# Patient Record
Sex: FEMALE | Race: WHITE | URBAN - METROPOLITAN AREA
[De-identification: names, ages, dates, MRNs, and addresses within clinical notes are randomized per-mention and may not be internally consistent; named-entity substitution may affect disease eponyms.]

---

## 2017-04-11 ENCOUNTER — TELEPHONE (OUTPATIENT)
Dept: ENDOCRINOLOGY | Age: 62
End: 2017-04-11

## 2017-04-11 RX ORDER — LEVOTHYROXINE SODIUM 100 UG/1
TABLET ORAL
Qty: 30 TAB | Refills: 12 | Status: SHIPPED | OUTPATIENT
Start: 2017-04-11 | End: 2017-06-06 | Stop reason: DRUGHIGH

## 2017-05-26 ENCOUNTER — TELEPHONE (OUTPATIENT)
Dept: ENDOCRINOLOGY | Age: 62
End: 2017-05-26

## 2017-05-26 DIAGNOSIS — E03.8 OTHER SPECIFIED ACQUIRED HYPOTHYROIDISM: Primary | ICD-10-CM

## 2017-06-06 ENCOUNTER — OFFICE VISIT (OUTPATIENT)
Dept: ENDOCRINOLOGY | Age: 62
End: 2017-06-06

## 2017-06-06 VITALS
HEIGHT: 61 IN | SYSTOLIC BLOOD PRESSURE: 142 MMHG | HEART RATE: 92 BPM | BODY MASS INDEX: 38.34 KG/M2 | RESPIRATION RATE: 18 BRPM | OXYGEN SATURATION: 96 % | TEMPERATURE: 98.3 F | DIASTOLIC BLOOD PRESSURE: 93 MMHG | WEIGHT: 203.1 LBS

## 2017-06-06 DIAGNOSIS — E66.09 NON MORBID OBESITY DUE TO EXCESS CALORIES: ICD-10-CM

## 2017-06-06 DIAGNOSIS — E03.8 OTHER SPECIFIED ACQUIRED HYPOTHYROIDISM: Primary | ICD-10-CM

## 2017-06-06 RX ORDER — LEVOTHYROXINE SODIUM 112 UG/1
112 TABLET ORAL
Qty: 30 TAB | Refills: 12 | Status: SHIPPED | OUTPATIENT
Start: 2017-06-06 | End: 2018-05-17 | Stop reason: SDUPTHER

## 2017-06-06 NOTE — PATIENT INSTRUCTIONS
Start on  Unithroid   112 mcg  A  Day, on empty stomach with water only, no other meds or food or drinks   For next half hour     Stop synthroid 100       Take any kind of vitamins, calcium, iron   Pills  4 hours later

## 2017-06-06 NOTE — PROGRESS NOTES
Wt Readings from Last 3 Encounters:   06/06/17 203 lb 1.6 oz (92.1 kg)   03/01/16 201 lb (91.2 kg)   03/26/15 194 lb (88 kg)     Temp Readings from Last 3 Encounters:   06/06/17 98.3 °F (36.8 °C) (Oral)   03/01/16 98.4 °F (36.9 °C) (Oral)   03/26/15 96.5 °F (35.8 °C) (Oral)     BP Readings from Last 3 Encounters:   06/06/17 (!) 142/93   03/01/16 136/82   03/26/15 141/63     Pulse Readings from Last 3 Encounters:   06/06/17 92   03/01/16 69   03/26/15 87   patient states she has a cold and cough. Blood pressure elevated. No headache or visional disturbances noted.

## 2017-06-06 NOTE — PROGRESS NOTES
HISTORY OF PRESENT ILLNESS   Dominique Diallo is a 60  y.o. female. HPI   One year F/u visit for MNG after last visit March 2016  Gained 2  lbs     ??, discontinued TLC    Tolerating synthroid 100 mcg synthroid a day. Feels fatigued still     Denies any hyper or hypothyroid symptoms   Denies any compressive symptoms         Review of Systems   Constitutional: Negative. HENT: Negative. Eyes: Negative for pain and redness. Respiratory: Negative. Cardiovascular: Negative for chest pain, palpitations and leg swelling. Gastrointestinal: Negative. Negative for constipation. Genitourinary: Negative. Musculoskeletal: Negative for myalgias. Skin: Negative. Neurological: Negative. Endo/Heme/Allergies: Negative. Psychiatric/Behavioral: Negative for depression and memory loss. The patient does not have insomnia. Physical Exam   Constitutional: She is oriented to person, place, and time. She appears well-developed and well-nourished. HENT:   Head: Normocephalic. Eyes: Conjunctivae and extraocular motions are normal. Pupils are equal, round, and reactive to light. Neck: Normal range of motion. Neck supple. No JVD present. No tracheal deviation present. 2 times thyromegaly present. Cardiovascular: Normal rate, regular rhythm and normal heart sounds. No murmur heard. Pulmonary/Chest: Breath sounds normal.   Abdominal: Soft. Bowel sounds are normal.   Musculoskeletal: Normal range of motion. Lymphadenopathy:   She has no cervical adenopathy. Neurological: She is alert and oriented to person, place, and time. She has normal reflexes. Skin: Skin is warm. Lab Results   Component Value Date/Time    TSH 5.450 05/30/2017 10:04 AM    T4, Free 1.21 05/30/2017 10:04 AM              ASSESSMENT and PLAN     1. Hypothyroidism : perfectly euthyroid,  continue the dose of synthroid to 100 mcg daily.    Claims compliance       2 MNG :  From dec 2012- usg-- heterogenous gland and no nodules seen, but isthmus is thick - 5 mm   from jan 2011, with sub cm nodules bilaterally. Performed  usg jan 2014 :As no nodules were found to be significant, usg as needed  Does not require tissue sampling and planning on observation only. 3 obesity : admonished to control diet as she was rapidly gaining weight       4. Vit d def, HTN, HPL, pre diabetes  are being f/u by her pcp     5. Body mass index is 38.38 kg/(m^2).   Progressive weight gain noted   She is advised to be careful about her high calorie diet intake    Discussed weight loss medications   Belviq xr 20 mg a day   Discussed pros and cons           Labs in 6  months bnv

## 2017-11-30 LAB
T4 FREE SERPL-MCNC: 1.39 NG/DL (ref 0.82–1.77)
TSH SERPL DL<=0.005 MIU/L-ACNC: 0.68 UIU/ML (ref 0.45–4.5)

## 2017-12-06 ENCOUNTER — OFFICE VISIT (OUTPATIENT)
Dept: ENDOCRINOLOGY | Age: 62
End: 2017-12-06

## 2017-12-06 VITALS
HEART RATE: 82 BPM | OXYGEN SATURATION: 96 % | HEIGHT: 61 IN | DIASTOLIC BLOOD PRESSURE: 87 MMHG | WEIGHT: 211.6 LBS | TEMPERATURE: 97.7 F | BODY MASS INDEX: 39.95 KG/M2 | SYSTOLIC BLOOD PRESSURE: 148 MMHG | RESPIRATION RATE: 16 BRPM

## 2017-12-06 DIAGNOSIS — E04.2 MULTINODULAR GOITER: ICD-10-CM

## 2017-12-06 DIAGNOSIS — E04.2 NONTOXIC MULTINODULAR GOITER: Primary | ICD-10-CM

## 2017-12-06 DIAGNOSIS — R73.03 PREDIABETES: ICD-10-CM

## 2017-12-06 NOTE — PROGRESS NOTES
HISTORY OF PRESENT ILLNESS   Donya Nicolas is a 60  y.o. female. HPI   10 M  F/u visit for MNG after last visit June 2017   Tolerating synthroid 100 mcg synthroid a day. Feels fatigued still       GAINED 8 lbs   She is going to gym     Denies any hyper or hypothyroid symptoms   Denies any compressive symptoms         Review of Systems   Constitutional: Negative. HENT: Negative. Eyes: Negative for pain and redness. Respiratory: Negative. Cardiovascular: Negative for chest pain, palpitations and leg swelling. Gastrointestinal: Negative. Negative for constipation. Genitourinary: Negative. Musculoskeletal: Negative for myalgias. Skin: Negative. Neurological: Negative. Endo/Heme/Allergies: Negative. Psychiatric/Behavioral: Negative for depression and memory loss. The patient does not have insomnia. Physical Exam   Constitutional: She is oriented to person, place, and time. She appears well-developed and well-nourished. HENT:   Head: Normocephalic. Eyes: Conjunctivae and extraocular motions are normal. Pupils are equal, round, and reactive to light. Neck: Normal range of motion. Neck supple. No JVD present. No tracheal deviation present. 2 times thyromegaly present. Cardiovascular: Normal rate, regular rhythm and normal heart sounds. No murmur heard. Pulmonary/Chest: Breath sounds normal.   Abdominal: Soft. Bowel sounds are normal.   Musculoskeletal: Normal range of motion. Lymphadenopathy:   She has no cervical adenopathy. Neurological: She is alert and oriented to person, place, and time. She has normal reflexes. Skin: Skin is warm. Lab Results   Component Value Date/Time    TSH 0.677 11/29/2017 12:00 AM    T4, Free 1.39 11/29/2017 12:00 AM              ASSESSMENT and PLAN     1. Hypothyroidism : perfectly euthyroid,  continue the dose of synthroid to 100 mcg daily.    Claims compliance       2 MNG :  From dec 2012- usg-- heterogenous gland and no nodules seen, but isthmus is thick - 5 mm   from jan 2011, with sub cm nodules bilaterally. Performed  usg jan 2014 :As no nodules were found to be significant, usg as needed  Does not require tissue sampling and planning on observation only. 3 obesity : admonished to control diet as she was rapidly gaining weight       4. Vit d def, HTN, HPL, pre diabetes  are being f/u by her pcp     5. Body mass index is 39.98 kg/(m^2).   Progressive weight gain noted ( busy with sister )  She is advised to be careful about her high calorie diet intake    Discussed weight loss medications   Belviq xr 20 mg a day was suggested   Discussed pros and cons           Labs in 6  months bnv    > 50 % of time is spent on counseling   Patient voiced understanding her plan of care

## 2017-12-06 NOTE — PROGRESS NOTES
Chief Complaint   Patient presents with    Thyroid Problem     1. Have you been to the ER, urgent care clinic since your last visit? Hospitalized since your last visit? No    2. Have you seen or consulted any other health care providers outside of the [address] [place] [address] since your last visit? Include any pap smears or colon screening.  No       Wt Readings from Last 3 Encounters:   12/06/17 211 lb 9.6 oz (96 kg)   06/06/17 203 lb 1.6 oz (92.1 kg)   03/01/16 201 lb (91.2 kg)     Temp Readings from Last 3 Encounters:   12/06/17 97.7 °F (36.5 °C) (Oral)   06/06/17 98.3 °F (36.8 °C) (Oral)   03/01/16 98.4 °F (36.9 °C) (Oral)     BP Readings from Last 3 Encounters:   12/06/17 148/87   06/06/17 (!) 142/93   03/01/16 136/82     Pulse Readings from Last 3 Encounters:   12/06/17 82   06/06/17 92   03/01/16 69

## 2017-12-06 NOTE — MR AVS SNAPSHOT
Visit Information Date & Time Provider Department Dept. Phone Encounter #  
 12/6/2017  9:30 AM Laine Castaneda MD [facility] [place] [facility] [phone] [id number] Follow-up Instructions Return in about 6 months (around 6/6/2018). Upcoming Health Maintenance Date Due Hepatitis C Screening 1955 DTaP/Tdap/Td series (1 - Tdap) 1/29/1975 PAP AKA CERVICAL CYTOLOGY 1/29/1975 BREAST CANCER SCRN MAMMOGRAM 1/29/2004 FOBT Q 1 YEAR AGE 50-75 1/29/2004 ZOSTER VACCINE AGE 60> 11/29/2013 Influenza Age 5 to Adult 8/1/2017 Allergies as of 12/6/2017  Review Complete On: 12/6/2017 By: Laine Castaneda MD  
 No Known Allergies Current Immunizations  Never Reviewed No immunizations on file. Not reviewed this visit You Were Diagnosed With   
  
 Codes Comments Nontoxic multinodular goiter    -  Primary ICD-10-CM: E04.2 ICD-9-CM: 241.1 Multinodular goiter     ICD-10-CM: E04.2 ICD-9-CM: 241.1 BMI 39.0-39.9,adult     ICD-10-CM: G42.66 ICD-9-CM: V85.39 Vitals BP Pulse Temp Resp Height(growth percentile) Weight(growth percentile) 148/87 (BP 1 Location: Left arm, BP Patient Position: Sitting) 82 97.7 °F (36.5 °C) (Oral) 16 5' 1\" (1.549 m) 211 lb 9.6 oz (96 kg) SpO2 BMI OB Status Smoking Status 96% 39.98 kg/m2 Postmenopausal Never Smoker BMI and BSA Data Body Mass Index Body Surface Area  
 39.98 kg/m 2 2.03 m 2 Preferred Pharmacy Pharmacy Name Phone [address] [place] [facility] Margaret Jensen [phone] Your Updated Medication List  
  
   
This list is accurate as of: 12/6/17 10:09 AM.  Always use your most recent med list.  
  
  
  
  
 CLARITIN 10 mg tablet Generic drug:  loratadine Take 10 mg by mouth daily. FISH OIL PO Take  by mouth.  
  
 lisinopril 5 mg tablet Commonly known as:  Marissa Lights Take  by mouth daily. lorcaserin 20 mg Tb24 Commonly known as:  BELVIQ XR Take 1 Tab by mouth daily. Max Daily Amount: 1 Tab.  
  
 multivitamin tablet Commonly known as:  ONE A DAY Take 1 Tab by mouth daily. simvastatin 40 mg tablet Commonly known as:  ZOCOR Take  by mouth nightly. UNITHROID 112 mcg tablet Generic drug:  levothyroxine Take 1 Tab by mouth Daily (before breakfast). Stop levothyroxine 100 mcg dose   BMN  
  
 VITAMIN D2 50,000 unit capsule Generic drug:  ergocalciferol Take 50,000 Units by mouth. Follow-up Instructions Return in about 6 months (around 6/6/2018). Patient Instructions Unithroid   112 mcg  A  Day, on empty stomach with water only, no other meds or food or drinks   For next half hour Stop synthroid 100 Take any kind of vitamins, calcium, iron   Pills  4 hours later Introducing [facility]! [employer] introduces [employer] patient portal. Now you can access parts of your medical record, email your doctor's office, and request medication refills online. 1. In your internet browser, go to [email or web address] 2. Click on the First Time User? Click Here link in the Sign In box. You will see the New Member Sign Up page. 3. Enter your [employer] Access Code exactly as it appears below. You will not need to use this code after youve completed the sign-up process. If you do not sign up before the expiration date, you must request a new code. · [employer] Access Code: [email or web address] Expires: 3/6/2018 10:09 AM 
 
4. Enter the last four digits of your Social Security Number (xxxx) and Date of Birth (mm/dd/yyyy) as indicated and click Submit. You will be taken to the next sign-up page. 5. Create a JamLegendt ID. This will be your K[employer] Extreme login ID and cannot be changed, so think of one that is secure and easy to remember. 6. Create a [employer]Leg[employer] password. You can change your password at any time. 7. Enter your Password Reset Question and Answer. This can be used at a later time if you forget your password. 8. Enter your e-mail address. You will receive e-mail notification when new information is available in [address] 9. Click Sign Up. You can now view and download portions of your medical record. 10. Click the Download Summary menu link to download a portable copy of your medical information. If you have questions, please visit the Frequently Asked Questions section of the [employer] website. Remember, GO-SIM is NOT to be used for urgent needs. For medical emergencies, dial 911. Now available from your iPhone and Android! Please provide this summary of care documentation to your next provider. Your primary care clinician is listed as Nika [place]. If you have any questions after today's visit, please call [phone].

## 2017-12-06 NOTE — PATIENT INSTRUCTIONS
Unithroid   112 mcg  A  Day, on empty stomach with water only, no other meds or food or drinks   For next half hour     Stop synthroid 100       Take any kind of vitamins, calcium, iron   Pills  4 hours later

## 2018-05-17 DIAGNOSIS — E66.09 NON MORBID OBESITY DUE TO EXCESS CALORIES: ICD-10-CM

## 2018-05-17 RX ORDER — LEVOTHYROXINE SODIUM 112 UG/1
TABLET ORAL
Qty: 30 TAB | Refills: 12 | Status: SHIPPED | OUTPATIENT
Start: 2018-05-17 | End: 2019-03-08 | Stop reason: SDUPTHER

## 2018-08-25 ENCOUNTER — ED HISTORICAL/CONVERTED ENCOUNTER (OUTPATIENT)
Dept: OTHER | Age: 63
End: 2018-08-25

## 2018-11-07 ENCOUNTER — OP HISTORICAL/CONVERTED ENCOUNTER (OUTPATIENT)
Dept: OTHER | Age: 63
End: 2018-11-07

## 2019-02-22 ENCOUNTER — TELEPHONE (OUTPATIENT)
Dept: ENDOCRINOLOGY | Age: 64
End: 2019-02-22

## 2019-02-22 DIAGNOSIS — E04.2 NONTOXIC MULTINODULAR GOITER: Primary | ICD-10-CM

## 2019-03-01 DIAGNOSIS — E04.2 NONTOXIC MULTINODULAR GOITER: ICD-10-CM

## 2019-03-02 LAB
T4 FREE SERPL-MCNC: 1.31 NG/DL (ref 0.82–1.77)
TSH SERPL DL<=0.005 MIU/L-ACNC: 1.03 UIU/ML (ref 0.45–4.5)

## 2019-03-08 ENCOUNTER — OFFICE VISIT (OUTPATIENT)
Dept: ENDOCRINOLOGY | Age: 64
End: 2019-03-08

## 2019-03-08 VITALS
TEMPERATURE: 96.9 F | HEART RATE: 105 BPM | BODY MASS INDEX: 38.82 KG/M2 | OXYGEN SATURATION: 95 % | SYSTOLIC BLOOD PRESSURE: 127 MMHG | HEIGHT: 61 IN | RESPIRATION RATE: 18 BRPM | WEIGHT: 205.6 LBS | DIASTOLIC BLOOD PRESSURE: 74 MMHG

## 2019-03-08 DIAGNOSIS — E66.09 NON MORBID OBESITY DUE TO EXCESS CALORIES: ICD-10-CM

## 2019-03-08 DIAGNOSIS — E04.2 MULTINODULAR GOITER: ICD-10-CM

## 2019-03-08 DIAGNOSIS — E03.4 HYPOTHYROIDISM DUE TO ACQUIRED ATROPHY OF THYROID: Primary | ICD-10-CM

## 2019-03-08 RX ORDER — METFORMIN HYDROCHLORIDE 500 MG/1
1000 TABLET, EXTENDED RELEASE ORAL 2 TIMES DAILY WITH MEALS
Qty: 120 TAB | Refills: 6 | Status: SHIPPED | OUTPATIENT
Start: 2019-03-08 | End: 2019-09-09 | Stop reason: SDUPTHER

## 2019-03-08 RX ORDER — LISINOPRIL 10 MG/1
TABLET ORAL DAILY
COMMUNITY
End: 2019-11-04 | Stop reason: SDUPTHER

## 2019-03-08 RX ORDER — LEVOTHYROXINE SODIUM 112 UG/1
TABLET ORAL
Qty: 30 TAB | Refills: 12 | Status: SHIPPED | OUTPATIENT
Start: 2019-03-08 | End: 2019-09-09 | Stop reason: SDUPTHER

## 2019-03-08 RX ORDER — ZINC GLUCONATE 10 MG
LOZENGE ORAL
COMMUNITY
End: 2021-01-28 | Stop reason: ALTCHOICE

## 2019-03-08 RX ORDER — LEVOTHYROXINE SODIUM 112 UG/1
112 TABLET ORAL
Qty: 30 TAB | Refills: 12 | Status: CANCELLED | OUTPATIENT
Start: 2019-03-08

## 2019-03-08 RX ORDER — ATORVASTATIN CALCIUM 40 MG/1
TABLET, FILM COATED ORAL DAILY
COMMUNITY
End: 2019-11-04 | Stop reason: SDUPTHER

## 2019-03-08 NOTE — PROGRESS NOTES
1. Have you been to the ER, urgent care clinic since your last visit? No  Hospitalized since your last visit? No    2. Have you seen or consulted any other health care providers outside of the [address] [place] [address] since your last visit? Include any pap smears or colon screening.  No     Wt Readings from Last 3 Encounters:   03/08/19 205 lb 9.6 oz (93.3 kg)   12/06/17 211 lb 9.6 oz (96 kg)   06/06/17 203 lb 1.6 oz (92.1 kg)     Temp Readings from Last 3 Encounters:   03/08/19 96.9 °F (36.1 °C) (Oral)   12/06/17 97.7 °F (36.5 °C) (Oral)   06/06/17 98.3 °F (36.8 °C) (Oral)     BP Readings from Last 3 Encounters:   03/08/19 127/74   12/06/17 148/87   06/06/17 (!) 142/93     Pulse Readings from Last 3 Encounters:   03/08/19 (!) 105   12/06/17 82   06/06/17 92

## 2019-03-08 NOTE — PROGRESS NOTES
HISTORY OF PRESENT ILLNESS   Roderick Umaña is a 71  y.o. female. HPI     F/u visit for MNG after last visit December 2017     A long gap of 15 months   Reason unclear  Family issues     Lost 6 lbs     She got diagnosed with a1c of 6.8 %  2 weeks ago   She was suggested to take metformin but she refused it as she afraid of kidneys being hurt   She requested help for diabetes management too          Old history   Tolerating synthroid 100 mcg synthroid a day. Feels fatigued still    GAINED 8 lbs   She is going to gym     Denies any hyper or hypothyroid symptoms   Denies any compressive symptoms         Review of Systems   Constitutional: Negative. HENT: Negative. Eyes: Negative for pain and redness. Respiratory: Negative. Cardiovascular: Negative for chest pain, palpitations and leg swelling. Gastrointestinal: Negative. Negative for constipation. Genitourinary: Negative. Musculoskeletal: Negative for myalgias. Skin: Negative. Neurological: Negative. Endo/Heme/Allergies: Negative. Psychiatric/Behavioral: Negative for depression and memory loss. The patient does not have insomnia. Physical Exam   Constitutional: She is oriented to person, place, and time. She appears well-developed and well-nourished. HENT:   Head: Normocephalic. Eyes: Conjunctivae and extraocular motions are normal. Pupils are equal, round, and reactive to light. Neck: Normal range of motion. Neck supple. No JVD present. No tracheal deviation present. 2 times thyromegaly present. Cardiovascular: Normal rate, regular rhythm and normal heart sounds. No murmur heard. Pulmonary/Chest: Breath sounds normal.   Abdominal: Soft. Bowel sounds are normal.   Musculoskeletal: Normal range of motion. Lymphadenopathy:   She has no cervical adenopathy. Neurological: She is alert and oriented to person, place, and time. She has normal reflexes. Skin: Skin is warm.        Lab Results   Component Value Date/Time TSH 1.030 03/01/2019 11:29 AM    T4, Free 1.31 03/01/2019 11:29 AM              ASSESSMENT and PLAN     1. Hypothyroidism : perfectly euthyroid,  continue the dose of synthroid to 100 mcg daily. Claims compliance   Increased dose of UNITHROID  MCG A DAY  IN MAY 2017   SHE IS STAYING EITHYROID ON THIS DOSE SINCE THEN         2 . MNG :  From dec 2012- usg-- heterogenous gland and no nodules seen, but isthmus is thick - 5 mm   from jan 2011, with sub cm nodules bilaterally. Performed  usg jan 2014 :As no nodules were found to be significant, usg  Does not require tissue sampling and planning on observation only. 3.  obesity : admonished to control diet as she was rapidly gaining weight       4. Diabetes type 2 , uncontrolled    Explained importance of metformin at length and asked her to start on it at metformin 500 mg once a day and increase  as tolerated    DISCUSSED PATHO-PHYSIOLOGY OF DIABETES     Patient is advised to check blood sugars 1-2  times daily by rotation method. reviewed medications and side effects in detail  lab results and schedule of future lab studies reviewed with patient    specific diabetic recommendations: diabetic diet discussed in detail, written exchange diet given, low cholesterol diet, weight control and daily exercise discussed, home glucose monitoring emphasized, glucose meter dispensed to patient, all medications, side effects and compliance discussed carefully, diabetic Sick Day rules reviewed, handout given, foot care discussed and Podiatry visits discussed, annual eye examinations at Ophthalmology discussed, glycohemoglobin and other lab monitoring discussed and long term diabetic complications discussed    3. HTN : continue LISINOPRIL 10 MG. Patient is educated about importance of compliance with anti-hypertensives especially ARB/ACEI    4. Dyslipidemia : continue LIPITOR 40 MG .  Patient is educated about benefits and adverse effects of statins and explained how benefits outweigh risk. 5.  Body mass index is 38.85 kg/m².   Progressive weight gain noted ( busy with sister )  She is advised to be careful about her high calorie diet intake    Discussed weight loss medications   Belviq xr 20 mg a day was suggested   Discussed pros and cons           Labs in 6  months bnv    > 50 % of time is spent on counseling   Patient voiced understanding her plan of care

## 2019-08-26 DIAGNOSIS — E66.09 NON MORBID OBESITY DUE TO EXCESS CALORIES: ICD-10-CM

## 2019-08-26 DIAGNOSIS — E03.4 HYPOTHYROIDISM DUE TO ACQUIRED ATROPHY OF THYROID: ICD-10-CM

## 2019-08-26 DIAGNOSIS — E04.2 MULTINODULAR GOITER: ICD-10-CM

## 2019-08-27 LAB
CHOLEST SERPL-MCNC: 164 MG/DL (ref 100–199)
EST. AVERAGE GLUCOSE BLD GHB EST-MCNC: 140 MG/DL
HBA1C MFR BLD: 6.5 % (ref 4.8–5.6)
HDLC SERPL-MCNC: 36 MG/DL
INTERPRETATION, 910389: NORMAL
LDLC SERPL CALC-MCNC: 82 MG/DL (ref 0–99)
Lab: NORMAL
T4 FREE SERPL-MCNC: 1.42 NG/DL (ref 0.82–1.77)
TRIGL SERPL-MCNC: 232 MG/DL (ref 0–149)
TSH SERPL DL<=0.005 MIU/L-ACNC: 0.55 UIU/ML (ref 0.45–4.5)
VLDLC SERPL CALC-MCNC: 46 MG/DL (ref 5–40)

## 2019-09-09 ENCOUNTER — OFFICE VISIT (OUTPATIENT)
Dept: ENDOCRINOLOGY | Age: 64
End: 2019-09-09

## 2019-09-09 VITALS
OXYGEN SATURATION: 95 % | WEIGHT: 197.2 LBS | BODY MASS INDEX: 38.72 KG/M2 | HEART RATE: 101 BPM | SYSTOLIC BLOOD PRESSURE: 118 MMHG | HEIGHT: 60 IN | DIASTOLIC BLOOD PRESSURE: 76 MMHG | RESPIRATION RATE: 16 BRPM

## 2019-09-09 DIAGNOSIS — E11.65 UNCONTROLLED TYPE 2 DIABETES MELLITUS WITH HYPERGLYCEMIA (HCC): ICD-10-CM

## 2019-09-09 DIAGNOSIS — E03.4 HYPOTHYROIDISM DUE TO ACQUIRED ATROPHY OF THYROID: Primary | ICD-10-CM

## 2019-09-09 DIAGNOSIS — E66.09 NON MORBID OBESITY DUE TO EXCESS CALORIES: ICD-10-CM

## 2019-09-09 DIAGNOSIS — E66.01 SEVERE OBESITY (HCC): ICD-10-CM

## 2019-09-09 RX ORDER — METFORMIN HYDROCHLORIDE 500 MG/1
1000 TABLET, EXTENDED RELEASE ORAL 2 TIMES DAILY WITH MEALS
Qty: 120 TAB | Refills: 11 | Status: SHIPPED | OUTPATIENT
Start: 2019-09-09 | End: 2020-07-28 | Stop reason: SDUPTHER

## 2019-09-09 RX ORDER — LEVOTHYROXINE SODIUM 112 UG/1
TABLET ORAL
Qty: 30 TAB | Refills: 11 | Status: SHIPPED | OUTPATIENT
Start: 2019-09-09 | End: 2020-04-17 | Stop reason: SDUPTHER

## 2019-09-09 NOTE — LETTER
9/9/19 Patient: Whitney Rachel YOB: 1955 Date of Visit: 9/9/2019 Anselmo Chao MD 
[address] [place] [address] [address] [place] [id number] VIA Facsimile: [phone] Dear Anselmo Chao MD, Thank you for referring Ms. Caridad Rose to [address] [place] [address] for evaluation. My notes for this consultation are attached. If you have questions, please do not hesitate to call me. I look forward to following your patient along with you. Sincerely, Royal [place] MD

## 2019-09-09 NOTE — PROGRESS NOTES
Lab Results   Component Value Date/Time    Hemoglobin A1c 6.5 (H) 08/26/2019 08:42 AM    Hemoglobin A1c, External 6.1 12/23/2015     Lab Results   Component Value Date/Time    Cholesterol, total 164 08/26/2019 08:42 AM    HDL Cholesterol 36 (L) 08/26/2019 08:42 AM    LDL, calculated 82 08/26/2019 08:42 AM    VLDL, calculated 46 (H) 08/26/2019 08:42 AM    Triglyceride 232 (H) 08/26/2019 08:42 AM       Diabetic Foot and Eye Exam HM Status   Topic Date Due    Diabetic Foot Care  01/29/1964    Eye Exam  01/29/1964     Lab Results   Component Value Date/Time    TSH 0.545 08/26/2019 08:42 AM    T4, Free 1.42 08/26/2019 08:42 AM     Wt Readings from Last 3 Encounters:   09/09/19 197 lb 3.2 oz (89.4 kg)   03/08/19 205 lb 9.6 oz (93.3 kg)   12/06/17 211 lb 9.6 oz (96 kg)     Temp Readings from Last 3 Encounters:   03/08/19 96.9 °F (36.1 °C) (Oral)   12/06/17 97.7 °F (36.5 °C) (Oral)   06/06/17 98.3 °F (36.8 °C) (Oral)     BP Readings from Last 3 Encounters:   09/09/19 118/76   03/08/19 127/74   12/06/17 148/87     Pulse Readings from Last 3 Encounters:   09/09/19 (!) 101   03/08/19 (!) 105   12/06/17 82

## 2019-09-09 NOTE — PROGRESS NOTES
HISTORY OF PRESENT ILLNESS   Crescencio Dick is a 71  y.o. female. HPI     F/u visit for MNG AND  DM2   after last visit  March 2019       Lost 8 lbs   Had UTI  Has vertigo   Not well recently                 Old history :    A long gap of 15 months   Reason unclear  Family issues     Lost 8 lbs   She got diagnosed with a1c of 6.8 %  2 weeks ago   She was suggested to take metformin but she refused it as she afraid of kidneys being hurt   She requested help for diabetes management too          Old history   Tolerating synthroid 100 mcg synthroid a day. Feels fatigued still    GAINED 8 lbs   She is going to gym     Denies any hyper or hypothyroid symptoms   Denies any compressive symptoms         Review of Systems   Constitutional: Negative. HENT: Negative. Eyes: Negative for pain and redness. Respiratory: Negative. Cardiovascular: Negative for chest pain, palpitations and leg swelling. Gastrointestinal: Negative. Negative for constipation. Genitourinary: Negative. Musculoskeletal: Negative for myalgias. Skin: Negative. Neurological: Negative. Endo/Heme/Allergies: Negative. Psychiatric/Behavioral: Negative for depression and memory loss. The patient does not have insomnia. Physical Exam   Constitutional: She is oriented to person, place, and time. She appears well-developed and well-nourished. HENT:   Head: Normocephalic. Eyes: Conjunctivae and extraocular motions are normal. Pupils are equal, round, and reactive to light. Neck: Normal range of motion. Neck supple. No JVD present. No tracheal deviation present. 2 times thyromegaly present. Cardiovascular: Normal rate, regular rhythm and normal heart sounds. No murmur heard. Pulmonary/Chest: Breath sounds normal.   Abdominal: Soft. Bowel sounds are normal.   Musculoskeletal: Normal range of motion. Lymphadenopathy:   She has no cervical adenopathy. Neurological: She is alert and oriented to person, place, and time. She has normal reflexes. Skin: Skin is warm. Lab Results   Component Value Date/Time    TSH 0.545 08/26/2019 08:42 AM    T4, Free 1.42 08/26/2019 08:42 AM      Lab Results   Component Value Date/Time    Hemoglobin A1c 6.5 (H) 08/26/2019 08:42 AM    Hemoglobin A1c, External 6.1 12/23/2015    LDL, calculated 82 08/26/2019 08:42 AM      Lab Results   Component Value Date/Time    Cholesterol, total 164 08/26/2019 08:42 AM    HDL Cholesterol 36 (L) 08/26/2019 08:42 AM    LDL, calculated 82 08/26/2019 08:42 AM    LDL-C, External 107 12/23/2015    Triglyceride 232 (H) 08/26/2019 08:42 AM             ASSESSMENT and PLAN     1. Hypothyroidism : perfectly euthyroid,  continue the dose of synthroid to 100 mcg daily. Claims compliance   Increased dose of UNITHROID  MCG A DAY  IN MAY 2017   SHE IS STAYING EITHYROID ON THIS DOSE SINCE THEN         2 . MNG :  From dec 2012- usg-- heterogenous gland and no nodules seen, but isthmus is thick - 5 mm   from jan 2011, with sub cm nodules bilaterally. Performed  usg jan 2014 :As no nodules were found to be significant, usg  Does not require tissue sampling and planning on observation only. 3.  obesity : admonished to control diet as she was rapidly gaining weight       4. Diabetes type 2 , uncontrolled   A1C IS 6.5 %   FROM AUGUST 2019   COMPARED TO 6.8 %  FROM FEB- MARCH 2019   SHE IS TRYING ON TLC    Explained importance of metformin at length and asked her to start on it at metformin 500 mg once a day and increase  as tolerated    DISCUSSED PATHO-PHYSIOLOGY OF DIABETES     Patient is advised to check blood sugars 1-2  times daily by rotation method. reviewed medications and side effects in detail  lab results and schedule of future lab studies reviewed with patient      3. HTN : continue LISINOPRIL 10 MG. Patient is educated about importance of compliance with anti-hypertensives especially ARB/ACEI    4. Dyslipidemia : continue LIPITOR 40 MG .  Patient is educated about benefits and adverse effects of statins and explained how benefits outweigh risk. 5.  Body mass index is 38.51 kg/m².   Progressive weight gain noted ( busy with sister )  She is advised to be careful about her high calorie diet intake    Discussed weight loss medications   Belviq xr 20 mg a day was suggested   Discussed pros and cons           Labs in 6  months bnv    > 50 % of time is spent on counseling   Patient voiced understanding her plan of care

## 2019-09-09 NOTE — PATIENT INSTRUCTIONS
Unithroid   112 mcg  A  Day,   brand , on empty stomach with water only, no other meds or food or drinks   For next half hour     Take any kind of vitamins, calcium, iron   Pills  4 hours later      ----------------------------------------------------------------------------------------------------------      STAY ON METFORMIN ER  500 MG ONE PILL TO TWO PILLS WITH MEALS   ( PT IS ABLE TO TOLERATE 3 PILLS A DAY )        -------------------------------------------------------    AVOID FATTY FOODS AND RED MEATS

## 2019-09-10 LAB
ALBUMIN/CREAT UR: 5.4 MG/G CREAT (ref 0–30)
CREAT UR-MCNC: 154.2 MG/DL
MICROALBUMIN UR-MCNC: 8.4 UG/ML

## 2019-11-04 RX ORDER — ATORVASTATIN CALCIUM 40 MG/1
40 TABLET, FILM COATED ORAL DAILY
Qty: 30 TAB | Refills: 3 | Status: SHIPPED | OUTPATIENT
Start: 2019-11-04 | End: 2020-02-11

## 2019-11-04 RX ORDER — LISINOPRIL 10 MG/1
10 TABLET ORAL DAILY
Qty: 30 TAB | Refills: 3 | Status: SHIPPED | OUTPATIENT
Start: 2019-11-04 | End: 2020-07-28 | Stop reason: SDUPTHER

## 2019-11-04 NOTE — TELEPHONE ENCOUNTER
Refill on Lipitor and Lisinopril sent to [facility] 30 day supply. Patient says her pcp was filling but now Dr. Aaron Méndez should.

## 2019-11-04 NOTE — TELEPHONE ENCOUNTER
PCP: Sheba Pappas MD    Last appt: 9/9/2019  Future Appointments   Date Time Provider Deangelo Causey   3/2/2020  9:00 AM CDE [id number]   3/9/2020 11:00 AM Lewis Alfonso MD CDE [facility]       Requested Prescriptions     Pending Prescriptions Disp Refills    lisinopril (PRINIVIL, ZESTRIL) 10 mg tablet 30 Tab 3     Sig: Take 1 Tab by mouth daily.  atorvastatin (LIPITOR) 40 mg tablet 30 Tab 3     Sig: Take 1 Tab by mouth daily.

## 2020-02-11 RX ORDER — ATORVASTATIN CALCIUM 40 MG/1
TABLET, FILM COATED ORAL
Qty: 30 TAB | Refills: 3 | Status: SHIPPED | OUTPATIENT
Start: 2020-02-11 | End: 2020-06-10

## 2020-04-16 ENCOUNTER — TELEPHONE (OUTPATIENT)
Dept: ENDOCRINOLOGY | Age: 65
End: 2020-04-16

## 2020-04-16 NOTE — TELEPHONE ENCOUNTER
Patient states  [employer] is requesting prior auth for Unithroid.   Patient states she only has 2 pills left

## 2020-04-16 NOTE — TELEPHONE ENCOUNTER
PA initiated for Unithroid. Rep states Unithroid will no longer be covered at local pharmacy. Patient must receive Unithroid from [facility] (mail order). If patient does not want to use mail order then patient must receive Levothyroxine Sodium from local pharmacy. Patient informed. Patient would like MD opinion on which med she should take because MD changed Levothyroxine to Unithroid. Patient has 2 Unithroid tablets left. Please advise.

## 2020-04-16 NOTE — TELEPHONE ENCOUNTER
This patient can be told to use the unithroid - by using good Rx website where the brand unithroid comes for 18 $ - for  90 days  example at [facility] it is just 6 $ a month     This has nothing to do with her insurance plan

## 2020-04-17 DIAGNOSIS — E66.09 NON MORBID OBESITY DUE TO EXCESS CALORIES: ICD-10-CM

## 2020-04-17 RX ORDER — LEVOTHYROXINE SODIUM 112 UG/1
TABLET ORAL
Qty: 90 TAB | Refills: 4 | Status: SHIPPED | OUTPATIENT
Start: 2020-04-17 | End: 2020-07-28 | Stop reason: DRUGHIGH

## 2020-04-17 NOTE — TELEPHONE ENCOUNTER
Spoke to patient. Patient would like the Unithroid prescription to be printed out so she can pick it up and take to pharmacy today.

## 2020-06-10 RX ORDER — ATORVASTATIN CALCIUM 40 MG/1
TABLET, FILM COATED ORAL
Qty: 30 TAB | Refills: 3 | Status: SHIPPED | OUTPATIENT
Start: 2020-06-10 | End: 2020-07-28 | Stop reason: SDUPTHER

## 2020-06-11 ENCOUNTER — TELEPHONE (OUTPATIENT)
Dept: ENDOCRINOLOGY | Age: 65
End: 2020-06-11

## 2020-06-11 DIAGNOSIS — E04.2 NONTOXIC MULTINODULAR GOITER: ICD-10-CM

## 2020-06-11 DIAGNOSIS — E03.4 HYPOTHYROIDISM DUE TO ACQUIRED ATROPHY OF THYROID: ICD-10-CM

## 2020-06-11 DIAGNOSIS — E11.65 UNCONTROLLED TYPE 2 DIABETES MELLITUS WITH HYPERGLYCEMIA (HCC): Primary | ICD-10-CM

## 2020-06-30 ENCOUNTER — HOSPITAL ENCOUNTER (OUTPATIENT)
Dept: LAB | Age: 65
Discharge: HOME | End: 2020-06-30
Payer: MEDICARE

## 2020-06-30 PROCEDURE — 84443 ASSAY THYROID STIM HORMONE: CPT

## 2020-06-30 PROCEDURE — 80053 COMPREHEN METABOLIC PANEL: CPT

## 2020-06-30 PROCEDURE — 84439 ASSAY OF FREE THYROXINE: CPT

## 2020-06-30 PROCEDURE — 83036 HEMOGLOBIN GLYCOSYLATED A1C: CPT

## 2020-06-30 PROCEDURE — 36415 COLL VENOUS BLD VENIPUNCTURE: CPT

## 2020-06-30 PROCEDURE — 82043 UR ALBUMIN QUANTITATIVE: CPT

## 2020-06-30 PROCEDURE — 80061 LIPID PANEL: CPT

## 2020-07-01 LAB
ALBUMIN SERPL-MCNC: 4.5 G/DL (ref 3.8–4.8)
ALBUMIN/CREAT UR: 6 MG/G CREAT (ref 0–29)
ALBUMIN/GLOB SERPL: 2 {RATIO} (ref 1.2–2.2)
ALP SERPL-CCNC: 72 IU/L (ref 39–117)
ALT SERPL-CCNC: 24 IU/L (ref 0–32)
AST SERPL-CCNC: 18 IU/L (ref 0–40)
BILIRUB SERPL-MCNC: 0.3 MG/DL (ref 0–1.2)
BUN SERPL-MCNC: 14 MG/DL (ref 8–27)
BUN/CREAT SERPL: 18 (ref 12–28)
CALCIUM SERPL-MCNC: 10.2 MG/DL (ref 8.7–10.3)
CHLORIDE SERPL-SCNC: 107 MMOL/L (ref 96–106)
CHOLEST SERPL-MCNC: 153 MG/DL (ref 100–199)
CO2 SERPL-SCNC: 22 MMOL/L (ref 20–29)
CREAT SERPL-MCNC: 0.77 MG/DL (ref 0.57–1)
CREAT UR-MCNC: 150.7 MG/DL
EST. AVERAGE GLUCOSE BLD GHB EST-MCNC: 120 MG/DL
GLOBULIN SER CALC-MCNC: 2.2 G/DL (ref 1.5–4.5)
GLUCOSE SERPL-MCNC: 108 MG/DL (ref 65–99)
HBA1C MFR BLD: 5.8 % (ref 4.8–5.6)
HDLC SERPL-MCNC: 39 MG/DL
INTERPRETATION, 910389: NORMAL
LDLC SERPL CALC-MCNC: 75 MG/DL (ref 0–99)
Lab: NORMAL
MICROALBUMIN UR-MCNC: 9.1 UG/ML
POTASSIUM SERPL-SCNC: 5 MMOL/L (ref 3.5–5.2)
PROT SERPL-MCNC: 6.7 G/DL (ref 6–8.5)
SODIUM SERPL-SCNC: 145 MMOL/L (ref 134–144)
T4 FREE SERPL-MCNC: 1.67 NG/DL (ref 0.82–1.77)
TRIGL SERPL-MCNC: 196 MG/DL (ref 0–149)
TSH SERPL DL<=0.005 MIU/L-ACNC: 0.05 UIU/ML (ref 0.45–4.5)
VLDLC SERPL CALC-MCNC: 39 MG/DL (ref 5–40)

## 2020-07-28 ENCOUNTER — OFFICE VISIT (OUTPATIENT)
Dept: ENDOCRINOLOGY | Age: 65
End: 2020-07-28

## 2020-07-28 VITALS
WEIGHT: 179.4 LBS | HEIGHT: 60 IN | TEMPERATURE: 97.3 F | HEART RATE: 98 BPM | DIASTOLIC BLOOD PRESSURE: 85 MMHG | BODY MASS INDEX: 35.22 KG/M2 | SYSTOLIC BLOOD PRESSURE: 130 MMHG | OXYGEN SATURATION: 97 % | RESPIRATION RATE: 18 BRPM

## 2020-07-28 DIAGNOSIS — E11.65 UNCONTROLLED TYPE 2 DIABETES MELLITUS WITH HYPERGLYCEMIA (HCC): Primary | ICD-10-CM

## 2020-07-28 DIAGNOSIS — E04.2 NONTOXIC MULTINODULAR GOITER: ICD-10-CM

## 2020-07-28 DIAGNOSIS — E03.4 HYPOTHYROIDISM DUE TO ACQUIRED ATROPHY OF THYROID: ICD-10-CM

## 2020-07-28 DIAGNOSIS — I10 ESSENTIAL HYPERTENSION: ICD-10-CM

## 2020-07-28 DIAGNOSIS — E78.2 MIXED HYPERLIPIDEMIA: ICD-10-CM

## 2020-07-28 RX ORDER — ATORVASTATIN CALCIUM 40 MG/1
TABLET, FILM COATED ORAL
Qty: 90 TAB | Refills: 4 | Status: SHIPPED | OUTPATIENT
Start: 2020-07-28 | End: 2020-10-10

## 2020-07-28 RX ORDER — METFORMIN HYDROCHLORIDE 500 MG/1
TABLET, EXTENDED RELEASE ORAL
Qty: 270 TAB | Refills: 4 | Status: SHIPPED | OUTPATIENT
Start: 2020-07-28 | End: 2020-09-08

## 2020-07-28 RX ORDER — LISINOPRIL 10 MG/1
10 TABLET ORAL
Qty: 90 TAB | Refills: 4 | Status: SHIPPED | OUTPATIENT
Start: 2020-07-28 | End: 2021-09-01 | Stop reason: SDUPTHER

## 2020-07-28 RX ORDER — LEVOTHYROXINE SODIUM 100 UG/1
100 TABLET ORAL
Qty: 90 TAB | Refills: 4 | Status: SHIPPED | OUTPATIENT
Start: 2020-07-28 | End: 2021-01-28 | Stop reason: DRUGHIGH

## 2020-07-28 NOTE — PATIENT INSTRUCTIONS
start on  Unithroid  100 mcg  A  Day,   brand , on empty stomach with water only, no other meds or food or drinks   For next half hour , stop 112 mcg dose  ( 90 day ) Take any kind of vitamins, calcium, iron   Pills  4 hours later 
 
 
----------------------------------------------------------------------------------------------------- 
 
Decrease   METFORMIN ER  500 MG ONE PILL in AM   And 2 pills in  PM  WITH MEALS      90  Pills    ( 90 day supply  ) 
 
 
atorvostatin 40 mg at night  (  90 days ) Lisinopril 10 mg at night  ( 90 days )

## 2020-07-28 NOTE — PROGRESS NOTES
HISTORY OF PRESENT ILLNESS   Italo Rosario is a 76  y.o. female. HPI     F/u visit for MNG AND  DM2   after last visit  Sept 2019        Lost 18 lbs   Doing TLC very well         Old history   :     Lost 8 lbs   Had UTI  Has vertigo   Not well recently     Old history :    A long gap of 15 months   Reason unclear  Family issues   Lost 8 lbs   She got diagnosed with a1c of 6.8 %  2 weeks ago   She was suggested to take metformin but she refused it as she afraid of kidneys being hurt   She requested help for diabetes management too          Old history   Tolerating synthroid 100 mcg synthroid a day. Feels fatigued still  GAINED 8 lbs   She is going to gym     Denies any hyper or hypothyroid symptoms   Denies any compressive symptoms         Review of Systems   Constitutional: Negative. HENT: Negative. Eyes: Negative for pain and redness. Respiratory: Negative. Cardiovascular: Negative for chest pain, palpitations and leg swelling. Gastrointestinal: Negative. Negative for constipation. Genitourinary: Negative. Musculoskeletal: Negative for myalgias. Skin: Negative. Neurological: Negative. Endo/Heme/Allergies: Negative. Psychiatric/Behavioral: Negative for depression and memory loss. The patient does not have insomnia. Physical Exam   Constitutional: She is oriented to person, place, and time. She appears well-developed and well-nourished. HENT:   Head: Normocephalic. Eyes: Conjunctivae and extraocular motions are normal. Pupils are equal, round, and reactive to light. Neck: Normal range of motion. Neck supple. No JVD present. No tracheal deviation present. 2 times thyromegaly present. Cardiovascular: Normal rate, regular rhythm and normal heart sounds. No murmur heard. Pulmonary/Chest: Breath sounds normal.   Abdominal: Soft. Bowel sounds are normal.   Musculoskeletal: Normal range of motion. Lymphadenopathy:   She has no cervical adenopathy.    Neurological: She is alert and oriented to person, place, and time. She has normal reflexes. Skin: Skin is warm. Lab Results   Component Value Date/Time    TSH 0.049 (L) 06/30/2020 10:02 AM    T4, Free 1.67 06/30/2020 10:02 AM      Lab Results   Component Value Date/Time    Hemoglobin A1c 5.8 (H) 06/30/2020 10:02 AM    Hemoglobin A1c 6.5 (H) 08/26/2019 08:42 AM    Hemoglobin A1c, External 6.1 12/23/2015    Glucose 108 (H) 06/30/2020 10:02 AM    Microalb/Creat ratio (ug/mg creat.) 6 06/30/2020 10:02 AM    LDL, calculated 75 06/30/2020 10:02 AM    Creatinine 0.77 06/30/2020 10:02 AM      Lab Results   Component Value Date/Time    Cholesterol, total 153 06/30/2020 10:02 AM    HDL Cholesterol 39 (L) 06/30/2020 10:02 AM    LDL, calculated 75 06/30/2020 10:02 AM    LDL-C, External 107 12/23/2015    Triglyceride 196 (H) 06/30/2020 10:02 AM             ASSESSMENT and PLAN     1. Hypothyroidism : perfectly euthyroid,  continue the dose of synthroid to 100 mcg daily. Claims compliance   Increased dose of UNITHROID  MCG A DAY  IN MAY 2017   SHE IS STAYING EITHYROID ON THIS DOSE SINCE THEN     July 2020  : suppressed TSH  ( may be from losing weight )  Will lower dose to 100 mcg a day  , stop 112 mcg a day         2 . MNG :  From dec 2012- [facility]-- heterogenous gland and no nodules seen, but isthmus is thick - 5 mm   from jan 2011, with sub cm nodules bilaterally. Performed  usg jan 2014 :As no nodules were found to be significant, usg  Does not require tissue sampling and planning on observation only. 3.  obesity : admonished to control diet as she was rapidly gaining weight       4.  Diabetes type 2 , uncontrolled : a1c is 5.9 %   From  July 2020   Compared to  A1C IS 6.5 %   FROM AUGUST 2019   COMPARED TO 6.8 %  FROM [facility]- MARCH [facility] July 2020 :  Lost good weight   She is doing TLC   Stay on metformin er at 3 pills a day  ( one AM and 2  In PM )        Sept 2019    SHE IS TRYING ON TLC    Explained importance of metformin at length and asked her to start on it at metformin 500 mg once a day and increase  as tolerated  DISCUSSED PATHO-PHYSIOLOGY OF DIABETES     Patient is advised to check blood sugars 1-2  times daily by rotation method. reviewed medications and side effects in detail  lab results and schedule of future lab studies reviewed with patient      3. HTN : continue LISINOPRIL 10 MG. Patient is educated about importance of compliance with anti-hypertensives especially ARB/ACEI    4. Dyslipidemia : continue LIPITOR 40 MG . Patient is educated about benefits and adverse effects of statins and explained how benefits outweigh risk. 5.  Body mass index is 35.04 kg/m². Progressive weight gain noted ( busy with sister )  She is advised to be careful about her high calorie diet intake        6.  Weight loss - advising pt to get cancer surveillance done , given her sisters' history        Labs in 6  months bnv    > 50 % of time is spent on counseling   Patient voiced understanding her plan of care

## 2020-07-28 NOTE — LETTER
7/28/20 Patient: Chalo Jose YOB: 1955 Date of Visit: 7/28/2020 Finesse Dudley MD 
[place] [phone] VIA Facsimile: [phone] Dear Finesse Dudley MD, Thank you for referring Ms. Rosalio Stubbs to [id number] [place] [address] for evaluation. My notes for this consultation are attached. If you have questions, please do not hesitate to call me. I look forward to following your patient along with you. Sincerely, Claudine Lane MD

## 2020-07-28 NOTE — PROGRESS NOTES
1. Have you been to the ER, urgent care clinic since your last visit? No  Hospitalized since your last visit? No    2. Have you seen or consulted any other health care providers outside of the [address] [place] [address] since your last visit? Include any pap smears or colon screening. No    Wt Readings from Last 3 Encounters:   07/28/20 179 lb 6.4 oz (81.4 kg)   09/09/19 197 lb 3.2 oz (89.4 kg)   03/08/19 205 lb 9.6 oz (93.3 kg)     Temp Readings from Last 3 Encounters:   07/28/20 97.3 °F (36.3 °C) (Oral)   03/08/19 96.9 °F (36.1 °C) (Oral)   12/06/17 97.7 °F (36.5 °C) (Oral)     BP Readings from Last 3 Encounters:   07/28/20 130/85   09/09/19 118/76   03/08/19 127/74     Pulse Readings from Last 3 Encounters:   07/28/20 98   09/09/19 (!) 101   03/08/19 (!) 105     Lab Results   Component Value Date/Time    Hemoglobin A1c 5.8 (H) 06/30/2020 10:02 AM    Hemoglobin A1c, External 6.1 12/23/2015     Patient does not have meter today.

## 2020-09-08 RX ORDER — METFORMIN HYDROCHLORIDE 500 MG/1
TABLET, EXTENDED RELEASE ORAL
Qty: 120 TAB | Refills: 4 | Status: SHIPPED | OUTPATIENT
Start: 2020-09-08 | End: 2021-01-07

## 2020-10-10 RX ORDER — ATORVASTATIN CALCIUM 40 MG/1
TABLET, FILM COATED ORAL
Qty: 30 TAB | Refills: 4 | Status: SHIPPED | OUTPATIENT
Start: 2020-10-10 | End: 2021-01-28 | Stop reason: SDUPTHER

## 2020-10-28 DIAGNOSIS — E04.2 NONTOXIC MULTINODULAR GOITER: ICD-10-CM

## 2020-10-29 ENCOUNTER — HOSPITAL ENCOUNTER (OUTPATIENT)
Dept: LAB | Age: 65
Discharge: HOME | End: 2020-10-29
Payer: MEDICARE

## 2020-10-29 PROCEDURE — 36415 COLL VENOUS BLD VENIPUNCTURE: CPT

## 2020-10-29 PROCEDURE — 84443 ASSAY THYROID STIM HORMONE: CPT

## 2020-10-30 LAB — TSH SERPL DL<=0.005 MIU/L-ACNC: 0.83 UIU/ML (ref 0.45–4.5)

## 2021-01-07 RX ORDER — METFORMIN HYDROCHLORIDE 500 MG/1
TABLET, EXTENDED RELEASE ORAL
Qty: 120 TAB | Refills: 4 | Status: SHIPPED | OUTPATIENT
Start: 2021-01-07 | End: 2021-01-28 | Stop reason: SDUPTHER

## 2021-01-07 RX ORDER — METFORMIN HYDROCHLORIDE 500 MG/1
TABLET, EXTENDED RELEASE ORAL
Qty: 120 TAB | Refills: 4 | Status: SHIPPED | OUTPATIENT
Start: 2021-01-07 | End: 2021-03-25 | Stop reason: SDUPTHER

## 2021-01-28 ENCOUNTER — OFFICE VISIT (OUTPATIENT)
Dept: ENDOCRINOLOGY | Age: 66
End: 2021-01-28
Payer: MEDICARE

## 2021-01-28 VITALS
HEART RATE: 104 BPM | WEIGHT: 182 LBS | TEMPERATURE: 98.2 F | RESPIRATION RATE: 18 BRPM | HEIGHT: 60 IN | OXYGEN SATURATION: 95 % | DIASTOLIC BLOOD PRESSURE: 79 MMHG | BODY MASS INDEX: 35.73 KG/M2 | SYSTOLIC BLOOD PRESSURE: 126 MMHG

## 2021-01-28 DIAGNOSIS — E78.2 MIXED HYPERLIPIDEMIA: Primary | ICD-10-CM

## 2021-01-28 DIAGNOSIS — E03.4 HYPOTHYROIDISM DUE TO ACQUIRED ATROPHY OF THYROID: ICD-10-CM

## 2021-01-28 DIAGNOSIS — I10 ESSENTIAL HYPERTENSION: ICD-10-CM

## 2021-01-28 DIAGNOSIS — E11.65 UNCONTROLLED TYPE 2 DIABETES MELLITUS WITH HYPERGLYCEMIA (HCC): ICD-10-CM

## 2021-01-28 PROCEDURE — 3044F HG A1C LEVEL LT 7.0%: CPT | Performed by: INTERNAL MEDICINE

## 2021-01-28 PROCEDURE — 3017F COLORECTAL CA SCREEN DOC REV: CPT | Performed by: INTERNAL MEDICINE

## 2021-01-28 PROCEDURE — G8536 NO DOC ELDER MAL SCRN: HCPCS | Performed by: INTERNAL MEDICINE

## 2021-01-28 PROCEDURE — G8427 DOCREV CUR MEDS BY ELIG CLIN: HCPCS | Performed by: INTERNAL MEDICINE

## 2021-01-28 PROCEDURE — G8752 SYS BP LESS 140: HCPCS | Performed by: INTERNAL MEDICINE

## 2021-01-28 PROCEDURE — G8400 PT W/DXA NO RESULTS DOC: HCPCS | Performed by: INTERNAL MEDICINE

## 2021-01-28 PROCEDURE — G8417 CALC BMI ABV UP PARAM F/U: HCPCS | Performed by: INTERNAL MEDICINE

## 2021-01-28 PROCEDURE — 2022F DILAT RTA XM EVC RTNOPTHY: CPT | Performed by: INTERNAL MEDICINE

## 2021-01-28 PROCEDURE — 99214 OFFICE O/P EST MOD 30 MIN: CPT | Performed by: INTERNAL MEDICINE

## 2021-01-28 PROCEDURE — 1090F PRES/ABSN URINE INCON ASSESS: CPT | Performed by: INTERNAL MEDICINE

## 2021-01-28 PROCEDURE — 1101F PT FALLS ASSESS-DOCD LE1/YR: CPT | Performed by: INTERNAL MEDICINE

## 2021-01-28 PROCEDURE — G8510 SCR DEP NEG, NO PLAN REQD: HCPCS | Performed by: INTERNAL MEDICINE

## 2021-01-28 PROCEDURE — G8754 DIAS BP LESS 90: HCPCS | Performed by: INTERNAL MEDICINE

## 2021-01-28 RX ORDER — ATORVASTATIN CALCIUM 40 MG/1
TABLET, FILM COATED ORAL
Qty: 90 TAB | Refills: 5 | Status: SHIPPED | OUTPATIENT
Start: 2021-01-28 | End: 2021-09-01 | Stop reason: SDUPTHER

## 2021-01-28 RX ORDER — LEVOTHYROXINE SODIUM 112 UG/1
TABLET ORAL
Qty: 90 TAB | Refills: 2
Start: 2021-01-28 | End: 2021-09-08 | Stop reason: DRUGHIGH

## 2021-01-28 NOTE — PATIENT INSTRUCTIONS
SPECIFIC INSTRUCTIONS BELOW      start on  Unithroid  100 mcg  A  Day,   brand , on empty stomach with water only, no other meds or food or drinks   For next half hour , stop 112 mcg dose  ( 90 day )    Take any kind of vitamins, calcium, iron   Pills  4 hours later      -----------------------------------------------------------------------------------------------------     METFORMIN ER  500 MG ONE PILL in AM   And 2 pills in  PM  WITH MEALS      90  Pills    ( 90 day supply  ) ( No refills  Until patient calls us )      atorvostatin 40 mg at night  (  90 days )      Lisinopril 10 mg at night  ( 90 days )        PAY ATTENTION TO THESE GENERAL INSTRUCTIONS     - HEALTH MAINTENANCE IS NOT GOING TO BE UP TO DATE ON YOUR AVS- PLEASE IGNORE   - YOUR MED LIST IS NOT UP TO DATE AS SOME CHANGES ARE BEING MADE AFTER THE VISIT - FOLLOW SPECIFIC INSTRUCTIONS ABOVE     Results     *Normal results will not be notified by a phone call starting January 1 2021   *If you have an upcoming visit, the results will be discussed at the visit   *Please sign up for MY CHART if you want access to your lab and test results  *Abnormal results which require immediate attention will be notified by phone call   *Abnormal results which do not require immediate assistance will be notified in 1-2 weeks       Refills    -    have your pharmacy send us a refill request  Phone calls  -  Allow  24 hrs.  for non-urgent calls to be returned  Prior authorization - It may take 2-4 weeks to process  Forms  -  FMLA, DMV etc., will take up to 2 weeks to process  Cancellations - please notify the office 2 days in advance   Samples  - will only be dispensed at visits     --------------------------------------------------------------------------------------------

## 2021-01-28 NOTE — LETTER
1/31/2021 Patient: Edie Gant YOB: 1955 Date of Visit: 1/28/2021 Gui Barba MD 
[place] [phone] Via Fax: [phone] Dear Gui Barba MD, Thank you for referring Ms. Erasmo Drake to [address] [place] [address] for evaluation. My notes for this consultation are attached. If you have questions, please do not hesitate to call me. I look forward to following your patient along with you. Sincerely, Phylicia Mathur MD

## 2021-01-28 NOTE — PROGRESS NOTES
1. Have you been to the ER, urgent care clinic since your last visit? No  Hospitalized since your last visit? No    2. Have you seen or consulted any other health care providers outside of the [address] [place] [address] since your last visit? Include any pap smears or colon screening. No    Wt Readings from Last 3 Encounters:   01/28/21 182 lb (82.6 kg)   07/28/20 179 lb 6.4 oz (81.4 kg)   09/09/19 197 lb 3.2 oz (89.4 kg)     Temp Readings from Last 3 Encounters:   01/28/21 98.2 °F (36.8 °C) (Oral)   07/28/20 97.3 °F (36.3 °C) (Oral)   03/08/19 96.9 °F (36.1 °C) (Oral)     BP Readings from Last 3 Encounters:   01/28/21 126/79   07/28/20 130/85   09/09/19 118/76     Pulse Readings from Last 3 Encounters:   01/28/21 (!) 104   07/28/20 98   09/09/19 (!) 101     Lab Results   Component Value Date/Time    Hemoglobin A1c 5.7 (H) 01/21/2021 09:05 AM    Hemoglobin A1c, External 6.1 12/23/2015     Patient does not have meter today.

## 2021-01-28 NOTE — PROGRESS NOTES
HISTORY OF PRESENT ILLNESS   Akanksha Shaw is a 78   y.o. female. HPI    F/u visit for MNG AND  DM2   after last visit  July 2020    Gained 3 lbs   Her sisters  Have renal problems and has stayed busy quite a bit   One sister had renal transplant -  And it was rejected  -  SICK  And that kept  Her mind busy   She says her refills are not done on LIPITOR , but on oct 2 2020 - refills were sent to pharmacy         July 2020     Lost 18 lbs   Doing TLC very well       Old history :    A long gap of 15 months   Reason unclear  Family issues   Lost 8 lbs   She got diagnosed with a1c of 6.8 %  2 weeks ago   She was suggested to take metformin but she refused it as she afraid of kidneys being hurt   She requested help for diabetes management too            Review of Systems   Constitutional: Negative. HENT: Negative. Psychiatric/Behavioral: Negative for depression and memory loss. The patient does not have insomnia. Physical Exam   Constitutional: She is oriented to person, place, and time. She appears well-developed and well-nourished. HENT:   Head: Normocephalic. Eyes: Conjunctivae and extraocular motions are normal. Pupils are equal, round, and reactive to light. Neck: Normal range of motion. Neck supple. No JVD present. No tracheal deviation present. 2 times thyromegaly present. Cardiovascular: Normal rate, regular rhythm and normal heart sounds. No murmur heard.    Pulmonary/Chest: Breath sounds normal.       Lab Results   Component Value Date/Time    TSH 6.31 (H) 01/21/2021 09:05 AM    T4, Free 1.1 01/21/2021 09:05 AM      Lab Results   Component Value Date/Time    Hemoglobin A1c 5.7 (H) 01/21/2021 09:05 AM    Hemoglobin A1c 5.8 (H) 06/30/2020 10:02 AM    Hemoglobin A1c 6.5 (H) 08/26/2019 08:42 AM    Hemoglobin A1c, External 6.1 12/23/2015    Glucose 118 (H) 01/21/2021 09:05 AM    Microalb/Creat ratio (ug/mg creat.) 6 06/30/2020 10:02 AM    LDL, calculated 250.6 (H) 01/21/2021 09:05 AM Creatinine 1.05 (H) 01/21/2021 09:05 AM      Lab Results   Component Value Date/Time    Cholesterol, total 349 (H) 01/21/2021 09:05 AM    HDL Cholesterol 41 01/21/2021 09:05 AM    LDL, calculated 250.6 (H) 01/21/2021 09:05 AM    LDL-C, External 107 12/23/2015    Triglyceride 287 (H) 01/21/2021 09:05 AM    CHOL/HDL Ratio 8.5 (H) 01/21/2021 09:05 AM             ASSESSMENT and PLAN     1. Hypothyroidism : perfectly euthyroid,  continue the dose of synthroid to 100 mcg daily. Claims compliance  -  Increased dose of UNITHROID  MCG A DAY  IN MAY 2017   SHE IS STAYING EITHYROID ON THIS DOSE SINCE THEN     July 2020  : suppressed TSH  ( may be from losing weight ), lowered  dose to 100 mcg a day  , stopped  112 mcg a day   Oct 2020  ; TSH  0.8     Jan 2021  :  TSH  -  UP again  To 6 and I AM INCREASING DOSE BACK  MCG A DAY   Take 100 mcg dose  Which she has 90 day supply - 2 pills on Sundays       2. Diabetes type 2 , uncontrolled : a1c is 5.9 %   From  July 2020   Compared to  A1C IS 6.5 %   FROM AUGUST 2019   COMPARED TO 6.8 %  FROM FEB- [facility] 2019     She is doing well on metformin er at 3 pills a day  ( one AM and 2  In PM )  Patient is advised to check blood sugars 1-2  times daily by rotation method. 3. HTN : continue LISINOPRIL 10 MG. Patient is educated about importance of compliance with anti-hypertensives especially ARB/ACEI    4. Dyslipidemia : the LDL is going along with familial hypercholesteremia   Dramatic change because of non compliance with lipitor     continue LIPITOR 40 MG . Patient is educated about risks associated with severe LDL including CAD and CVD . 5.  Body mass index is 35.54 kg/m². Progressive weight gain noted ( busy with sister )  She is advised to be careful about her high calorie diet intake      6. Eduardo Palomo MNG :  From dec 2012- [facility]-- heterogenous gland and no nodules seen, but isthmus is thick - 5 mm   from jan 2011, with sub cm nodules bilaterally.    Performed  usg jan 2014 :As no nodules were found to be significant, usg  Does not require tissue sampling and planning on observation only.            Labs in 6  months bnv    > 50 % of time is spent on counseling   Patient voiced understanding her plan of care

## 2021-01-31 ENCOUNTER — DOCUMENTATION ONLY (OUTPATIENT)
Dept: ENDOCRINOLOGY | Age: 66
End: 2021-01-31

## 2021-01-31 NOTE — PROGRESS NOTES
Called patient and made her be aware that her lab papers were by mistake given to anther patient - Mr. Bernarda Hilario who was seen today and he returned her lab sheets back on the same day     Kalyan Ellsworth MD

## 2021-03-25 ENCOUNTER — TELEPHONE (OUTPATIENT)
Dept: ENDOCRINOLOGY | Age: 66
End: 2021-03-25

## 2021-03-25 RX ORDER — METFORMIN HYDROCHLORIDE 500 MG/1
TABLET, EXTENDED RELEASE ORAL
Qty: 120 TAB | Refills: 3 | Status: SHIPPED | OUTPATIENT
Start: 2021-03-25 | End: 2021-09-08 | Stop reason: SDUPTHER

## 2021-09-01 ENCOUNTER — OFFICE VISIT (OUTPATIENT)
Dept: ENDOCRINOLOGY | Age: 66
End: 2021-09-01
Payer: MEDICARE

## 2021-09-01 VITALS
WEIGHT: 179.6 LBS | TEMPERATURE: 97.2 F | OXYGEN SATURATION: 96 % | HEART RATE: 97 BPM | RESPIRATION RATE: 18 BRPM | SYSTOLIC BLOOD PRESSURE: 158 MMHG | BODY MASS INDEX: 35.26 KG/M2 | HEIGHT: 60 IN | DIASTOLIC BLOOD PRESSURE: 91 MMHG

## 2021-09-01 DIAGNOSIS — E11.65 UNCONTROLLED TYPE 2 DIABETES MELLITUS WITH HYPERGLYCEMIA (HCC): Primary | ICD-10-CM

## 2021-09-01 DIAGNOSIS — E03.4 HYPOTHYROIDISM DUE TO ACQUIRED ATROPHY OF THYROID: ICD-10-CM

## 2021-09-01 DIAGNOSIS — I10 ESSENTIAL HYPERTENSION: ICD-10-CM

## 2021-09-01 DIAGNOSIS — E78.2 MIXED HYPERLIPIDEMIA: ICD-10-CM

## 2021-09-01 LAB
ALBUMIN SERPL-MCNC: 4.1 G/DL (ref 3.5–5)
ALBUMIN/GLOB SERPL: 1.4 {RATIO} (ref 1.1–2.2)
ALP SERPL-CCNC: 84 U/L (ref 45–117)
ALT SERPL-CCNC: 28 U/L (ref 12–78)
ANION GAP SERPL CALC-SCNC: 5 MMOL/L (ref 5–15)
AST SERPL-CCNC: 19 U/L (ref 15–37)
BILIRUB SERPL-MCNC: 0.4 MG/DL (ref 0.2–1)
BUN SERPL-MCNC: 13 MG/DL (ref 6–20)
BUN/CREAT SERPL: 16 (ref 12–20)
CALCIUM SERPL-MCNC: 9.9 MG/DL (ref 8.5–10.1)
CHLORIDE SERPL-SCNC: 109 MMOL/L (ref 97–108)
CHOLEST SERPL-MCNC: 198 MG/DL
CO2 SERPL-SCNC: 28 MMOL/L (ref 21–32)
CREAT SERPL-MCNC: 0.8 MG/DL (ref 0.55–1.02)
CREAT UR-MCNC: 73.5 MG/DL
GLOBULIN SER CALC-MCNC: 2.9 G/DL (ref 2–4)
GLUCOSE SERPL-MCNC: 136 MG/DL (ref 65–100)
HBA1C MFR BLD HPLC: 5.7 %
HDLC SERPL-MCNC: 49 MG/DL
HDLC SERPL: 4 {RATIO} (ref 0–5)
LDLC SERPL CALC-MCNC: 99 MG/DL (ref 0–100)
MICROALBUMIN UR-MCNC: 0.67 MG/DL
MICROALBUMIN/CREAT UR-RTO: 9 MG/G (ref 0–30)
POTASSIUM SERPL-SCNC: 4.3 MMOL/L (ref 3.5–5.1)
PROT SERPL-MCNC: 7 G/DL (ref 6.4–8.2)
SODIUM SERPL-SCNC: 142 MMOL/L (ref 136–145)
TRIGL SERPL-MCNC: 250 MG/DL (ref ?–150)
TSH SERPL DL<=0.05 MIU/L-ACNC: 0.19 UIU/ML (ref 0.36–3.74)
VLDLC SERPL CALC-MCNC: 50 MG/DL

## 2021-09-01 PROCEDURE — 3044F HG A1C LEVEL LT 7.0%: CPT | Performed by: INTERNAL MEDICINE

## 2021-09-01 PROCEDURE — 1090F PRES/ABSN URINE INCON ASSESS: CPT | Performed by: INTERNAL MEDICINE

## 2021-09-01 PROCEDURE — 1101F PT FALLS ASSESS-DOCD LE1/YR: CPT | Performed by: INTERNAL MEDICINE

## 2021-09-01 PROCEDURE — G8755 DIAS BP > OR = 90: HCPCS | Performed by: INTERNAL MEDICINE

## 2021-09-01 PROCEDURE — G8510 SCR DEP NEG, NO PLAN REQD: HCPCS | Performed by: INTERNAL MEDICINE

## 2021-09-01 PROCEDURE — G8417 CALC BMI ABV UP PARAM F/U: HCPCS | Performed by: INTERNAL MEDICINE

## 2021-09-01 PROCEDURE — G8536 NO DOC ELDER MAL SCRN: HCPCS | Performed by: INTERNAL MEDICINE

## 2021-09-01 PROCEDURE — G8400 PT W/DXA NO RESULTS DOC: HCPCS | Performed by: INTERNAL MEDICINE

## 2021-09-01 PROCEDURE — G8753 SYS BP > OR = 140: HCPCS | Performed by: INTERNAL MEDICINE

## 2021-09-01 PROCEDURE — 83036 HEMOGLOBIN GLYCOSYLATED A1C: CPT | Performed by: INTERNAL MEDICINE

## 2021-09-01 PROCEDURE — G8427 DOCREV CUR MEDS BY ELIG CLIN: HCPCS | Performed by: INTERNAL MEDICINE

## 2021-09-01 PROCEDURE — 99214 OFFICE O/P EST MOD 30 MIN: CPT | Performed by: INTERNAL MEDICINE

## 2021-09-01 PROCEDURE — 3017F COLORECTAL CA SCREEN DOC REV: CPT | Performed by: INTERNAL MEDICINE

## 2021-09-01 PROCEDURE — 2022F DILAT RTA XM EVC RTNOPTHY: CPT | Performed by: INTERNAL MEDICINE

## 2021-09-01 RX ORDER — LISINOPRIL 10 MG/1
10 TABLET ORAL
Qty: 90 TABLET | Refills: 3 | Status: SHIPPED | OUTPATIENT
Start: 2021-09-01 | End: 2022-03-02 | Stop reason: SDUPTHER

## 2021-09-01 RX ORDER — ATORVASTATIN CALCIUM 40 MG/1
TABLET, FILM COATED ORAL
Qty: 90 TABLET | Refills: 3 | Status: SHIPPED | OUTPATIENT
Start: 2021-09-01 | End: 2022-03-02 | Stop reason: SDUPTHER

## 2021-09-01 NOTE — PATIENT INSTRUCTIONS
SPECIFIC INSTRUCTIONS BELOW       Do not refill until labs result from today sept 1 2021       Unithroid  ? ? mcg  A  Day,   brand , on empty stomach with water only, no other meds or food or drinks   For next half hour       Take any kind of vitamins, calcium, iron   Pills  4 hours later      -----------------------------------------------------------------------------------------------------     METFORMIN ER  500 MG ONE PILL in AM   And 2 pills in  PM  WITH MEALS      90  Pills    ( 90 day supply  ) ( No refills  Until patient calls us )      atorvostatin 40 mg at night  (  90 days )      Lisinopril 10 mg at night  ( 90 days )          -------------PAY ATTENTION TO THESE GENERAL INSTRUCTIONS -----------------    -ANY tests other than blood work, which you opt to do  outside the  [facility] imaging facilities, you are responsible for prior authorizations if  required    - HEALTH MAINTENANCE IS NOT GOING TO BE UP TO DATE ON YOUR AVS- PLEASE IGNORE   - YOUR MED LIST IS NOT UP TO DATE AS SOME CHANGES ARE BEING MADE AFTER THE VISIT - FOLLOW SPECIFIC INSTRUCTIONS  ABOVE     Results     *Normal results will not be notified by a phone call starting January 1 2021   *If you have an upcoming visit, the results will be discussed at the visit   *Please sign up for MY CHART if you want access to your lab and test results  *Abnormal results which require immediate attention will be notified by phone call   *Abnormal results which do not require immediate assistance will be notified in 1-2 weeks       Refills    -    have your pharmacy send us a refill request . Refills are done max for one year and a visit is a must before refills are extended    Follow up appointments -  highly encourage you to make it when you are checking out. We can accommodate you into the schedule based on your clinical situation, but not for extending refills beyond a year.  Labs are important to give refills and is important to get labs before the visit     Phone calls  -  Allow  24 hrs.  for non-urgent calls to be returned  Prior authorization - It may take 2-4 weeks to process  Forms  -  FMLA, DMV etc., will take up to 2 weeks to process  Cancellations - please notify the office 2 days in advance   Samples  - will only be dispensed at visits       If not showing for the appointments and cancelling appointments within 24 hours are kept track of and three  of such situations in  two consecutive years will likely be considered for termination from the practice    -------------------------------------------------------------------------------------------------------------------

## 2021-09-01 NOTE — PROGRESS NOTES
Room 1    Identified pt with two pt identifiers(name and ). Reviewed record in preparation for visit and have obtained necessary documentation. All patient medications has been reviewed. Chief Complaint   Patient presents with    Diabetes    Medication Evaluation       3 most recent PHQ Screens 2021   Little interest or pleasure in doing things Not at all   Feeling down, depressed, irritable, or hopeless Not at all   Total Score PHQ 2 0         Health Maintenance Review: Patient reminded of \"due or due soon\" health maintenance. I have asked the patient to contact his/her primary care provider (PCP) for follow-up on his/her health maintenance. Vitals:    21 1048   Pulse: 97   Resp: 18   Temp: 97.2 °F (36.2 °C)   TempSrc: Temporal   SpO2: 96%   Weight: 179 lb 9.6 oz (81.5 kg)   Height: 5' (1.524 m)   PainSc:   0 - No pain         Lab Results   Component Value Date/Time    Hemoglobin A1c 5.7 (H) 2021 09:05 AM    Hemoglobin A1c, External 6.1 2015 12:00 AM       Coordination of Care Questionnaire:   1) Have you been to an emergency room, urgent care, or hospitalized since your last visit?   no       2. Have seen or consulted any other health care provider since your last visit? NO      Patient is accompanied by self I have received verbal consent from Ross Mohs to discuss any/all medical information while they are present in the room.

## 2021-09-01 NOTE — PROGRESS NOTES
HISTORY OF PRESENT ILLNESS   Argenis Fernández is a 78   y.o. female. HPI    F/u visit for MNG AND  DM2   after last visit  Jan 2021       She was busy this AM and noted her BP Is higher   She went to [place] in the interim, and had 100 mcg synthroid filled in between   The correct dose was 112 mcg a day         Jan 2021     Gained 3 lbs   Her sisters  Have renal problems and has stayed busy quite a bit   One sister had renal transplant -  And it was rejected  -  SICK  And that kept  Her mind busy   She says her refills are not done on LIPITOR , but on oct 2 2020 - refills were sent to pharmacy         July 2020     Lost 18 lbs   Doing TLC very well       Old history :    A long gap of 15 months   Reason unclear  Family issues   Lost 8 lbs   She got diagnosed with a1c of 6.8 %  2 weeks ago   She was suggested to take metformin but she refused it as she afraid of kidneys being hurt   She requested help for diabetes management too        Review of Systems   Constitutional: Negative. HENT: Negative. Psychiatric/Behavioral: Negative for depression and memory loss. The patient does not have insomnia. Physical Exam   Constitutional: She is oriented to person, place, and time. She appears well-developed and well-nourished. Neck: Normal range of motion. Neck supple. No JVD present. No tracheal deviation present. 2 times thyromegaly present. Pulmonary/Chest: Breath sounds normal.       Labs not done         ASSESSMENT and PLAN     1. Hypothyroidism : perfectly euthyroid,  continue the dose of synthroid to 100 mcg daily.    Claims compliance  -  Increased dose of UNITHROID  MCG A DAY  IN MAY 2017   SHE IS STAYING EITHYROID ON THIS DOSE SINCE THEN     July 2020  : suppressed TSH  ( may be from losing weight ), lowered  dose to 100 mcg a day  , stopped  112 mcg a day   Oct 2020  ; TSH  0.8     Jan 2021  :  TSH  -  UP again  To 6 and I AM INCREASING DOSE BACK  MCG A DAY   Take 100 mcg dose  Which she has 90 day supply - 2 pills on Sundays       Sept 2021  : labs today   Supposed to be  On 112 mcg dose a day , but  she is taking yellow 100 mcg dose pills that too only one pill a day   She ran out of refills on her trip to [place], she asked for refill by showing the pill bottle. She had plenty of 100 mcg dose pills left at last visit and was told to take extra pill on sundays             2. Diabetes type 2 , uncontrolled : a1c is 5.9 %   From  July 2020   Compared to  A1C IS 6.5 %   FROM AUGUST 2019   COMPARED TO 6.8 %  FROM FEB- [facility] 2019   She is doing well on metformin er at 3 pills a day  ( one AM and 2  In PM )    Patient is advised to check blood sugars 1-2  times daily by rotation method. 3. HTN : continue LISINOPRIL 10 MG. 4.  Dyslipidemia : the LDL is going along with familial hypercholesteremia   Dramatic change because of compliance with lipitor     continue LIPITOR 40 MG . Patient is educated about risks associated with severe LDL including CAD and CVD . 5.  Body mass index is 35.08 kg/m². 6. . MNG :  From dec 2012- [facility]-- heterogenous gland and no nodules seen, but isthmus is thick - 5 mm   from jan 2011, with sub cm nodules bilaterally. Performed  usg jan 2014 :As no nodules were found to be significant, usg  Does not require tissue sampling and planning on observation only.            Labs in 6  months bnv      Reviewed results with patient and discussed the labs being ordered today/bnv  Patient voiced understanding of plan of care

## 2021-09-01 NOTE — LETTER
9/1/2021    Patient: Lolly Eckert   YOB: 1955   Date of Visit: 9/1/2021     Aldo Ge MD  [facility]  [address] [place] [id number]  Via Fax: [phone]    Dear Aldo Ge MD,      Thank you for referring Ms. Sigrid Maza to [address] [place] [address] for evaluation. My notes for this consultation are attached. If you have questions, please do not hesitate to call me. I look forward to following your patient along with you.       Sincerely,    Fanny Veliz MD

## 2021-09-02 NOTE — PROGRESS NOTES
Pharmacist states this patient has not received any meds from them since January 5, 2021 for a 90 day supply of Unithroid 112mcg    Left message for patient to return call.

## 2021-09-02 NOTE — PROGRESS NOTES
When I am expecting high TSH, the TSH is lower  0.1   Somehow, this is not adding up right fashion   Ask pt to bring her medication bottle and any left at her house   Call pharmacy for the refill pattern, doses in the past 6 months

## 2021-09-03 RX ORDER — LEVOTHYROXINE SODIUM 100 UG/1
TABLET ORAL
Qty: 90 TABLET | Refills: 4 | OUTPATIENT
Start: 2021-09-03

## 2021-09-08 ENCOUNTER — DOCUMENTATION ONLY (OUTPATIENT)
Dept: ENDOCRINOLOGY | Age: 66
End: 2021-09-08

## 2021-09-08 RX ORDER — LEVOTHYROXINE SODIUM 112 UG/1
TABLET ORAL
Qty: 90 TABLET | Refills: 3 | Status: CANCELLED | OUTPATIENT
Start: 2021-09-08

## 2021-09-08 RX ORDER — LEVOTHYROXINE SODIUM 100 UG/1
TABLET ORAL
Qty: 90 TABLET | Refills: 1 | Status: SHIPPED | OUTPATIENT
Start: 2021-09-08 | End: 2022-03-02 | Stop reason: SDUPTHER

## 2021-09-08 RX ORDER — METFORMIN HYDROCHLORIDE 500 MG/1
TABLET, EXTENDED RELEASE ORAL
Qty: 270 TABLET | Refills: 3 | Status: SHIPPED | OUTPATIENT
Start: 2021-09-08 | End: 2022-03-02 | Stop reason: SDUPTHER

## 2021-09-08 NOTE — PROGRESS NOTES
I spoke to patient and explained that thyroid is abusable medication and we keep track of refills etc.,     I suggested to go higher dose to 112 mcg unithroid - filled jan and April  Per pt   She filled likely 100 mcg - she says in [place] this time sept 2021 ,  are at 0.1 indicating lower dose is needed than 100 mcg which is 88 mcg dose   I decided not to change dose and kept her still on   100 mcg a day , stopped 112 mcg dose     Dione Taylor MD

## 2022-02-22 LAB
ALBUMIN SERPL-MCNC: 4.8 G/DL (ref 3.8–4.8)
ALBUMIN/CREAT UR: <7 MG/G CREAT (ref 0–29)
ALBUMIN/GLOB SERPL: 2.3 {RATIO} (ref 1.2–2.2)
ALP SERPL-CCNC: 81 IU/L (ref 44–121)
ALT SERPL-CCNC: 16 IU/L (ref 0–32)
AST SERPL-CCNC: 19 IU/L (ref 0–40)
BILIRUB SERPL-MCNC: 0.4 MG/DL (ref 0–1.2)
BUN SERPL-MCNC: 13 MG/DL (ref 8–27)
BUN/CREAT SERPL: 13 (ref 12–28)
CALCIUM SERPL-MCNC: 10.3 MG/DL (ref 8.7–10.3)
CHLORIDE SERPL-SCNC: 102 MMOL/L (ref 96–106)
CHOLEST SERPL-MCNC: 200 MG/DL (ref 100–199)
CO2 SERPL-SCNC: 24 MMOL/L (ref 20–29)
CREAT SERPL-MCNC: 0.97 MG/DL (ref 0.57–1)
CREAT UR-MCNC: 44.1 MG/DL
EST. AVERAGE GLUCOSE BLD GHB EST-MCNC: 123 MG/DL
GLOBULIN SER CALC-MCNC: 2.1 G/DL (ref 1.5–4.5)
GLUCOSE SERPL-MCNC: 100 MG/DL (ref 65–99)
HBA1C MFR BLD: 5.9 % (ref 4.8–5.6)
HDLC SERPL-MCNC: 43 MG/DL
IMP & REVIEW OF LAB RESULTS: NORMAL
LDLC SERPL CALC-MCNC: 123 MG/DL (ref 0–99)
MICROALBUMIN UR-MCNC: <3 UG/ML
POTASSIUM SERPL-SCNC: 4.6 MMOL/L (ref 3.5–5.2)
PROT SERPL-MCNC: 6.9 G/DL (ref 6–8.5)
SODIUM SERPL-SCNC: 140 MMOL/L (ref 134–144)
T4 FREE SERPL-MCNC: 1.51 NG/DL (ref 0.82–1.77)
TRIGL SERPL-MCNC: 190 MG/DL (ref 0–149)
TSH SERPL DL<=0.005 MIU/L-ACNC: 0.35 UIU/ML (ref 0.45–4.5)
VLDLC SERPL CALC-MCNC: 34 MG/DL (ref 5–40)

## 2022-03-02 ENCOUNTER — OFFICE VISIT (OUTPATIENT)
Dept: ENDOCRINOLOGY | Age: 67
End: 2022-03-02
Payer: MEDICARE

## 2022-03-02 VITALS
BODY MASS INDEX: 33.92 KG/M2 | HEART RATE: 85 BPM | TEMPERATURE: 98.6 F | SYSTOLIC BLOOD PRESSURE: 143 MMHG | DIASTOLIC BLOOD PRESSURE: 88 MMHG | WEIGHT: 172.8 LBS | RESPIRATION RATE: 16 BRPM | OXYGEN SATURATION: 95 % | HEIGHT: 60 IN

## 2022-03-02 DIAGNOSIS — E03.4 HYPOTHYROIDISM DUE TO ACQUIRED ATROPHY OF THYROID: ICD-10-CM

## 2022-03-02 DIAGNOSIS — I10 ESSENTIAL HYPERTENSION: ICD-10-CM

## 2022-03-02 DIAGNOSIS — E11.65 UNCONTROLLED TYPE 2 DIABETES MELLITUS WITH HYPERGLYCEMIA (HCC): Primary | ICD-10-CM

## 2022-03-02 DIAGNOSIS — E04.2 NONTOXIC MULTINODULAR GOITER: ICD-10-CM

## 2022-03-02 DIAGNOSIS — E78.2 MIXED HYPERLIPIDEMIA: ICD-10-CM

## 2022-03-02 PROCEDURE — G8400 PT W/DXA NO RESULTS DOC: HCPCS | Performed by: INTERNAL MEDICINE

## 2022-03-02 PROCEDURE — 2022F DILAT RTA XM EVC RTNOPTHY: CPT | Performed by: INTERNAL MEDICINE

## 2022-03-02 PROCEDURE — G8510 SCR DEP NEG, NO PLAN REQD: HCPCS | Performed by: INTERNAL MEDICINE

## 2022-03-02 PROCEDURE — G8427 DOCREV CUR MEDS BY ELIG CLIN: HCPCS | Performed by: INTERNAL MEDICINE

## 2022-03-02 PROCEDURE — 1090F PRES/ABSN URINE INCON ASSESS: CPT | Performed by: INTERNAL MEDICINE

## 2022-03-02 PROCEDURE — 99214 OFFICE O/P EST MOD 30 MIN: CPT | Performed by: INTERNAL MEDICINE

## 2022-03-02 PROCEDURE — G8417 CALC BMI ABV UP PARAM F/U: HCPCS | Performed by: INTERNAL MEDICINE

## 2022-03-02 PROCEDURE — 1101F PT FALLS ASSESS-DOCD LE1/YR: CPT | Performed by: INTERNAL MEDICINE

## 2022-03-02 PROCEDURE — G8753 SYS BP > OR = 140: HCPCS | Performed by: INTERNAL MEDICINE

## 2022-03-02 PROCEDURE — G8536 NO DOC ELDER MAL SCRN: HCPCS | Performed by: INTERNAL MEDICINE

## 2022-03-02 PROCEDURE — 3017F COLORECTAL CA SCREEN DOC REV: CPT | Performed by: INTERNAL MEDICINE

## 2022-03-02 PROCEDURE — 3044F HG A1C LEVEL LT 7.0%: CPT | Performed by: INTERNAL MEDICINE

## 2022-03-02 PROCEDURE — G8754 DIAS BP LESS 90: HCPCS | Performed by: INTERNAL MEDICINE

## 2022-03-02 RX ORDER — ATORVASTATIN CALCIUM 40 MG/1
TABLET, FILM COATED ORAL
Qty: 90 TABLET | Refills: 3 | Status: SHIPPED | OUTPATIENT
Start: 2022-03-02

## 2022-03-02 RX ORDER — METFORMIN HYDROCHLORIDE 500 MG/1
TABLET, EXTENDED RELEASE ORAL
Qty: 270 TABLET | Refills: 3 | Status: SHIPPED | OUTPATIENT
Start: 2022-03-02

## 2022-03-02 RX ORDER — LEVOTHYROXINE SODIUM 100 UG/1
TABLET ORAL
Qty: 90 TABLET | Refills: 1 | OUTPATIENT
Start: 2022-03-02

## 2022-03-02 RX ORDER — LISINOPRIL 10 MG/1
10 TABLET ORAL
Qty: 90 TABLET | Refills: 3 | Status: SHIPPED | OUTPATIENT
Start: 2022-03-02

## 2022-03-02 RX ORDER — LEVOTHYROXINE SODIUM 100 UG/1
TABLET ORAL
Qty: 90 TABLET | Refills: 2 | Status: SHIPPED | OUTPATIENT
Start: 2022-03-02 | End: 2022-08-30

## 2022-03-02 NOTE — LETTER
3/2/2022    Patient: Joshua Farrell   YOB: 1955   Date of Visit: 3/2/2022     Kobe Roman MD  [facility] [address] [address]  Via Fax: [phone]    Dear Kobe Roman MD,      Thank you for referring Ms. Jena Sheikh to [id number] [place] [address] for evaluation. My notes for this consultation are attached. If you have questions, please do not hesitate to call me. I look forward to following your patient along with you.       Sincerely,    Jagjit Beach MD

## 2022-03-02 NOTE — PROGRESS NOTES
HISTORY OF PRESENT ILLNESS       Roxy Ward is a 75   y.o. female. HPI    F/u visit for MNG AND  DM2   after last visit  Sept 2021       Lost 7 lbs   She had to do  Few road  trips   She is now taking 100 mcg UNITHROID a day ,  Since our last conversation in sept 2021 Sept 2021     Gained 3 lbs   Her sisters  Have renal problems and has stayed busy quite a bit   One sister had renal transplant -  And it was rejected  -  SICK  And that kept  Her mind busy   She says her refills are not done on LIPITOR , but on oct 2 2020 - refills were sent to pharmacy         Old history :    A long gap of 15 months   Reason unclear  Family issues   Lost 8 lbs   She got diagnosed with a1c of 6.8 %  2 weeks ago   She was suggested to take metformin but she refused it as she afraid of kidneys being hurt   She requested help for diabetes management too            Review of Systems   Constitutional: Negative. HENT: Negative. Psychiatric/Behavioral: Negative for depression and memory loss. The patient does not have insomnia. Physical Exam   Constitutional: She is oriented to person, place, and time. She appears well-developed and well-nourished. HENT:   Head: Normocephalic. Eyes: Conjunctivae and extraocular motions are normal. Pupils are equal, round, and reactive to light. Neck: Normal range of motion. Neck supple. No JVD present. No tracheal deviation present. 2 times thyromegaly present. Cardiovascular: Normal rate, regular rhythm and normal heart sounds. No murmur heard.    Pulmonary/Chest: Breath sounds normal.       Lab Results   Component Value Date/Time    TSH 0.349 (L) 02/21/2022 12:00 AM    T4, Free 1.51 02/21/2022 12:00 AM      Lab Results   Component Value Date/Time    Hemoglobin A1c 5.9 (H) 02/21/2022 12:00 AM    Hemoglobin A1c 5.7 (H) 01/21/2021 09:05 AM    Hemoglobin A1c 5.8 (H) 06/30/2020 10:02 AM    Hemoglobin A1c, External 6.1 12/23/2015 12:00 AM    Glucose 100 (H) 02/21/2022 12:00 AM    Microalbumin/Creat ratio (mg/g creat) 9 09/01/2021 11:45 AM    Microalb/Creat ratio (ug/mg creat.) <7 02/21/2022 12:00 AM    Microalbumin,urine random 0.67 09/01/2021 11:45 AM    LDL, calculated 123 (H) 02/21/2022 12:00 AM    LDL, calculated 99 09/01/2021 11:45 AM    Creatinine 0.97 02/21/2022 12:00 AM      Lab Results   Component Value Date/Time    Cholesterol, total 200 (H) 02/21/2022 12:00 AM    HDL Cholesterol 43 02/21/2022 12:00 AM    LDL, calculated 123 (H) 02/21/2022 12:00 AM    LDL, calculated 99 09/01/2021 11:45 AM    LDL-C, External 107 12/23/2015 12:00 AM    Triglyceride 190 (H) 02/21/2022 12:00 AM    CHOL/HDL Ratio 4.0 09/01/2021 11:45 AM             ASSESSMENT and PLAN     1. Hypothyroidism : perfectly euthyroid,  continue the dose of synthroid to 100 mcg daily. Claims compliance  -  Increased dose of UNITHROID  MCG A DAY  IN MAY 2017   SHE IS STAYING EITHYROID ON THIS DOSE SINCE THEN     July 2020  : suppressed TSH  ( may be from losing weight ), lowered  dose to 100 mcg a day  , stopped  112 mcg a day   Oct 2020  ; TSH  0.8     Jan 2021  :  TSH  -  UP again  To 6 and I AM INCREASING DOSE BACK  MCG A DAY   Take 100 mcg dose  Which she has 90 day supply - 2 pills on Sundays       Sept 2021 : labs after the visit , showed TSH of 0.1 and I made a phone conversation- kept her  Going  On 100 mcg unithroid BMN      Feb 2022 :  TSH is 0.3 , on current dose of 100 mcg UNITHROID BMN          2. Diabetes type 2 , uncontrolled : a1c is 5.9 %   From  July 2020   Compared to  A1C IS 6.5 %   FROM AUGUST 2019   COMPARED TO 6.8 %  FROM FEB- [facility] 2019     She is doing well on metformin er at 3 pills a day  ( one AM and 2  In PM )  Patient is advised to check blood sugars 1-2  times daily by rotation method. 3. HTN : continue LISINOPRIL 10 MG. 4.  Dyslipidemia : the LDL is going along with familial hypercholesteremia   continue LIPITOR 40 MG .    Patient is educated about risks associated with severe LDL including CAD and CVD . 5.  Body mass index is 33.75 kg/m². Progressive weight gain noted ( busy with sister )  She is advised to be careful about her high calorie diet intake      6. Narcisa Tarango MNG :  From dec 2012- [facility]-- heterogenous gland and no nodules seen, but isthmus is thick - 5 mm   from jan 2011, with sub cm nodules bilaterally. Performed  usg jan 2014 :As no nodules were found to be significant, usg  Does not require tissue sampling and planning on observation only.            Labs in 6  months bnv      Reviewed results with patient and discussed the labs being ordered today/bnv  Patient voiced understanding of plan of care

## 2022-03-02 NOTE — PATIENT INSTRUCTIONS
SPECIFIC INSTRUCTIONS BELOW         Unithroid  100   mcg  A  Day,   brand , on empty stomach with water only, no other meds or food or drinks   For next half hour       Take any kind of vitamins, calcium, iron   Pills  4 hours later        -------------PAY ATTENTION TO THESE GENERAL INSTRUCTIONS -----------------      - The medications prescribed at this visit will not be available at pharmacy until 6 pm       - YOUR MED LIST IS NOT UP TO DATE AS SOME CHANGES ARE BEING MADE AFTER THE VISIT - FOLLOW SPECIFIC INSTRUCTIONS  ABOVE     -ANY tests other than blood work, which you opt to do  outside the  [facility] facilities, you are responsible for prior authorizations if  required    - [address] UP TO DATE ON YOUR AVS- PLEASE IGNORE     Results     *Normal results will not be notified by a phone call starting January 1 2021   *If you have an upcoming visit, the results will be discussed at the visit   *Please sign up for MY CHART if you want access to your lab and test results  *Abnormal results which require immediate attention will be notified by phone call   *Abnormal results which do not require immediate assistance will be notified in 1-2 weeks       Refills    -    have your pharmacy send us a refill request . Refills are done max for one year and a visit is a must before refills are extended    Follow up appointments -  highly encourage you to make it when you are checking out. We can accommodate you into the schedule based on your clinical situation, but not for extending refills beyond a year. Labs are important to give refills and is important to get labs before the visit     Phone calls  -  Allow  24 hrs.  for non-urgent calls to be returned  Prior authorization - It may take 2-4 weeks to process  Forms  -  FMLA, DMV etc., will take up to 2 weeks to process  Cancellations - please notify the office 2 days in advance   Samples  - will only be dispensed at visits       If not showing for the appointments and cancelling appointments within 24 hours are kept track of and three  of such situations in  two consecutive years will likely be considered for termination from the practice    -------------------------------------------------------------------------------------------------------------------

## 2022-03-18 PROBLEM — E66.01 SEVERE OBESITY (HCC): Status: ACTIVE | Noted: 2019-09-09

## 2022-06-14 NOTE — MR AVS SNAPSHOT
I lm to pt, she scheduled w/ Dr Amalia Baron on 7/18 in [place] to call back if she needs to reschedule  Visit Information Date & Time Provider Department Dept. Phone Encounter #  
 6/6/2017 11:15 AM Jocy Vicente MD [facility] [phone] [id number] Follow-up Instructions Return in about 6 months (around 12/6/2017). Upcoming Health Maintenance Date Due Hepatitis C Screening 1955 DTaP/Tdap/Td series (1 - Tdap) 1/29/1975 PAP AKA CERVICAL CYTOLOGY 1/29/1975 BREAST CANCER SCRN MAMMOGRAM 1/29/2004 FOBT Q 1 YEAR AGE 50-75 1/29/2004 ZOSTER VACCINE AGE 60> 1/29/2014 INFLUENZA AGE 9 TO ADULT 8/1/2017 Allergies as of 6/6/2017  Review Complete On: 6/6/2017 By: Jocy Vicente MD  
 No Known Allergies Current Immunizations  Never Reviewed No immunizations on file. Not reviewed this visit You Were Diagnosed With   
  
 Codes Comments Other specified acquired hypothyroidism    -  Primary ICD-10-CM: E03.8 ICD-9-CM: 244.8 Non morbid obesity due to excess calories     ICD-10-CM: E66.09 
ICD-9-CM: 278.00 BMI 38.0-38.9,adult     ICD-10-CM: S27.88 
ICD-9-CM: V85.38 Vitals BP Pulse Temp Resp Height(growth percentile) Weight(growth percentile) (!) 142/93 (BP 1 Location: Left arm, BP Patient Position: Sitting) 92 98.3 °F (36.8 °C) (Oral) 18 5' 1\" (1.549 m) 203 lb 1.6 oz (92.1 kg) SpO2 BMI OB Status Smoking Status 96% 38.38 kg/m2 Postmenopausal Never Smoker BMI and BSA Data Body Mass Index Body Surface Area  
 38.38 kg/m 2 1.99 m 2 Preferred Pharmacy Pharmacy Name Phone [facility] [address], [address] [place] [address] [phone] Your Updated Medication List  
  
   
This list is accurate as of: 6/6/17 12:08 PM.  Always use your most recent med list.  
  
  
  
  
 CLARITIN 10 mg tablet Generic drug:  loratadine Take 10 mg by mouth daily. FISH OIL PO Take  by mouth.  
  
 lisinopril 5 mg tablet Commonly known as:  Dulcie Mcburney  
 Take  by mouth daily. lorcaserin 20 mg Tb24 Commonly known as:  BELVIQ XR Take 1 Tab by mouth daily. Max Daily Amount: 1 Tab.  
  
 multivitamin tablet Commonly known as:  ONE A DAY Take 1 Tab by mouth daily. simvastatin 40 mg tablet Commonly known as:  ZOCOR Take  by mouth nightly. UNITHROID 112 mcg tablet Generic drug:  levothyroxine Take 1 Tab by mouth Daily (before breakfast). Stop levothyroxine 100 mcg dose   BMN  
  
 VITAMIN D2 50,000 unit capsule Generic drug:  ergocalciferol Take 50,000 Units by mouth. Prescriptions Printed Refills  
 lorcaserin (BELVIQ XR) 20 mg Tb24 6 Sig: Take 1 Tab by mouth daily. Max Daily Amount: 1 Tab. Class: Print Route: Oral  
  
Prescriptions Sent to Pharmacy Refills UNITHROID 112 mcg tablet 12 Sig: Take 1 Tab by mouth Daily (before breakfast). Stop levothyroxine 100 mcg dose   BMN Class: Normal  
 Pharmacy: [address] [place] [address] #: [phone] Route: Oral  
  
Follow-up Instructions Return in about 6 months (around 12/6/2017). Patient Instructions Start on  Unithroid   112 mcg  A  Day, on empty stomach with water only, no other meds or food or drinks   For next half hour Stop synthroid 100 Take any kind of vitamins, calcium, iron   Pills  4 hours later Introducing [facility]! Antonia [place] introduces iloho patient portal. Now you can access parts of your medical record, email your doctor's office, and request medication refills online. 1. In your internet browser, go to [email or web address] 2. Click on the First Time User? Click Here link in the Sign In box. You will see the New Member Sign Up page. 3. Enter your [employer] Access Code exactly as it appears below. You will not need to use this code after youve completed the sign-up process.  If you do not sign up before the expiration date, you must request a new code. · [employer] Access Code: [email or web address] Expires: 9/4/2017 12:07 PM 
 
4. Enter the last four digits of your Social Security Number (xxxx) and Date of Birth (mm/dd/yyyy) as indicated and click Submit. You will be taken to the next sign-up page. 5. Create a [employer] ID. This will be your [employer] login ID and cannot be changed, so think of one that is secure and easy to remember. 6. Create a [employer] password. You can change your password at any time. 7. Enter your Password Reset Question and Answer. This can be used at a later time if you forget your password. 8. Enter your e-mail address. You will receive e-mail notification when new information is available in [address] 9. Click Sign Up. You can now view and download portions of your medical record. 10. Click the Download Summary menu link to download a portable copy of your medical information. If you have questions, please visit the Frequently Asked Questions section of the [employer] website. Remember, [employer] is NOT to be used for urgent needs. For medical emergencies, dial 911. Now available from your iPhone and Android! Please provide this summary of care documentation to your next provider. Your primary care clinician is listed as Victory Gamaliel. If you have any questions after today's visit, please call [phone].

## 2022-08-30 RX ORDER — LEVOTHYROXINE SODIUM 100 UG/1
TABLET ORAL
Qty: 90 TABLET | Refills: 2 | Status: SHIPPED | OUTPATIENT
Start: 2022-08-30 | End: 2022-09-07 | Stop reason: SDUPTHER

## 2022-09-01 LAB
ALBUMIN SERPL-MCNC: 4.6 G/DL (ref 3.8–4.8)
ALBUMIN/CREAT UR: 6 MG/G CREAT (ref 0–29)
ALBUMIN/GLOB SERPL: 2 {RATIO} (ref 1.2–2.2)
ALP SERPL-CCNC: 84 IU/L (ref 44–121)
ALT SERPL-CCNC: 17 IU/L (ref 0–32)
AST SERPL-CCNC: 23 IU/L (ref 0–40)
BILIRUB SERPL-MCNC: 0.6 MG/DL (ref 0–1.2)
BUN SERPL-MCNC: 16 MG/DL (ref 8–27)
BUN/CREAT SERPL: 18 (ref 12–28)
CALCIUM SERPL-MCNC: 10.1 MG/DL (ref 8.7–10.3)
CHLORIDE SERPL-SCNC: 102 MMOL/L (ref 96–106)
CHOLEST SERPL-MCNC: 184 MG/DL (ref 100–199)
CO2 SERPL-SCNC: 24 MMOL/L (ref 20–29)
CREAT SERPL-MCNC: 0.9 MG/DL (ref 0.57–1)
CREAT UR-MCNC: 161.2 MG/DL
EGFR: 70 ML/MIN/1.73
EST. AVERAGE GLUCOSE BLD GHB EST-MCNC: 128 MG/DL
GLOBULIN SER CALC-MCNC: 2.3 G/DL (ref 1.5–4.5)
GLUCOSE SERPL-MCNC: 114 MG/DL (ref 65–99)
HBA1C MFR BLD: 6.1 % (ref 4.8–5.6)
HDLC SERPL-MCNC: 47 MG/DL
IMP & REVIEW OF LAB RESULTS: NORMAL
LDLC SERPL CALC-MCNC: 109 MG/DL (ref 0–99)
MICROALBUMIN UR-MCNC: 9.5 UG/ML
POTASSIUM SERPL-SCNC: 4.9 MMOL/L (ref 3.5–5.2)
PROT SERPL-MCNC: 6.9 G/DL (ref 6–8.5)
SODIUM SERPL-SCNC: 143 MMOL/L (ref 134–144)
TRIGL SERPL-MCNC: 157 MG/DL (ref 0–149)
TSH SERPL DL<=0.005 MIU/L-ACNC: 0.21 UIU/ML (ref 0.45–4.5)
VLDLC SERPL CALC-MCNC: 28 MG/DL (ref 5–40)

## 2022-09-07 ENCOUNTER — OFFICE VISIT (OUTPATIENT)
Dept: ENDOCRINOLOGY | Age: 67
End: 2022-09-07
Payer: MEDICARE

## 2022-09-07 VITALS
TEMPERATURE: 97.1 F | DIASTOLIC BLOOD PRESSURE: 84 MMHG | SYSTOLIC BLOOD PRESSURE: 134 MMHG | OXYGEN SATURATION: 96 % | RESPIRATION RATE: 18 BRPM | HEART RATE: 87 BPM | WEIGHT: 172.6 LBS | BODY MASS INDEX: 33.89 KG/M2 | HEIGHT: 60 IN

## 2022-09-07 DIAGNOSIS — I10 ESSENTIAL HYPERTENSION: ICD-10-CM

## 2022-09-07 DIAGNOSIS — E11.65 UNCONTROLLED TYPE 2 DIABETES MELLITUS WITH HYPERGLYCEMIA (HCC): ICD-10-CM

## 2022-09-07 DIAGNOSIS — E03.4 HYPOTHYROIDISM DUE TO ACQUIRED ATROPHY OF THYROID: Primary | ICD-10-CM

## 2022-09-07 DIAGNOSIS — E78.2 MIXED HYPERLIPIDEMIA: ICD-10-CM

## 2022-09-07 PROCEDURE — G8427 DOCREV CUR MEDS BY ELIG CLIN: HCPCS | Performed by: INTERNAL MEDICINE

## 2022-09-07 PROCEDURE — G8752 SYS BP LESS 140: HCPCS | Performed by: INTERNAL MEDICINE

## 2022-09-07 PROCEDURE — 3044F HG A1C LEVEL LT 7.0%: CPT | Performed by: INTERNAL MEDICINE

## 2022-09-07 PROCEDURE — 3017F COLORECTAL CA SCREEN DOC REV: CPT | Performed by: INTERNAL MEDICINE

## 2022-09-07 PROCEDURE — 1123F ACP DISCUSS/DSCN MKR DOCD: CPT | Performed by: INTERNAL MEDICINE

## 2022-09-07 PROCEDURE — 1101F PT FALLS ASSESS-DOCD LE1/YR: CPT | Performed by: INTERNAL MEDICINE

## 2022-09-07 PROCEDURE — 99214 OFFICE O/P EST MOD 30 MIN: CPT | Performed by: INTERNAL MEDICINE

## 2022-09-07 PROCEDURE — G8400 PT W/DXA NO RESULTS DOC: HCPCS | Performed by: INTERNAL MEDICINE

## 2022-09-07 PROCEDURE — G8417 CALC BMI ABV UP PARAM F/U: HCPCS | Performed by: INTERNAL MEDICINE

## 2022-09-07 PROCEDURE — 1090F PRES/ABSN URINE INCON ASSESS: CPT | Performed by: INTERNAL MEDICINE

## 2022-09-07 PROCEDURE — G8754 DIAS BP LESS 90: HCPCS | Performed by: INTERNAL MEDICINE

## 2022-09-07 PROCEDURE — G8510 SCR DEP NEG, NO PLAN REQD: HCPCS | Performed by: INTERNAL MEDICINE

## 2022-09-07 PROCEDURE — 2022F DILAT RTA XM EVC RTNOPTHY: CPT | Performed by: INTERNAL MEDICINE

## 2022-09-07 PROCEDURE — G8536 NO DOC ELDER MAL SCRN: HCPCS | Performed by: INTERNAL MEDICINE

## 2022-09-07 RX ORDER — LEVOTHYROXINE SODIUM 100 UG/1
TABLET ORAL
Qty: 90 TABLET | Refills: 3 | Status: SHIPPED | OUTPATIENT
Start: 2022-09-07

## 2022-09-07 RX ORDER — EZETIMIBE 10 MG/1
10 TABLET ORAL DAILY
Qty: 90 TABLET | Refills: 3 | Status: SHIPPED | OUTPATIENT
Start: 2022-09-07

## 2022-09-07 NOTE — LETTER
9/7/2022    Patient: Wilmer Schneider   YOB: 1955   Date of Visit: 9/7/2022     Jasmine Connolly MD  [facility] [address]  Via Fax: [phone]    Dear Jasmine Connolly MD,      Thank you for referring Ms. Luna Mary to [address] [place] [address] for evaluation. My notes for this consultation are attached. If you have questions, please do not hesitate to call me. I look forward to following your patient along with you.       Sincerely,    Talia Tyson MD

## 2022-09-07 NOTE — PROGRESS NOTES
HISTORY OF PRESENT ILLNESS       Bing Carver is a 75   y.o. female. HPI    F/u visit for hypothyroidism  AND  DM2   after last visit  March 2022     Weight is stable       March 2022     Lost 7 lbs   She had to do  Few road  trips   She is now taking 100 mcg UNITHROID a day ,  Since our last conversation in sept 2021       Old history :    A long gap of 15 months   Reason unclear  Family issues   Lost 8 lbs   She got diagnosed with a1c of 6.8 %  2 weeks ago   She was suggested to take metformin but she refused it as she afraid of kidneys being hurt   She requested help for diabetes management too            Review of Systems   Constitutional: Negative. HENT: Negative. Psychiatric/Behavioral: Negative for depression and memory loss. The patient does not have insomnia. Physical Exam   Constitutional: She is oriented to person, place, and time. She appears well-developed and well-nourished. Neck: Normal range of motion. Neck supple. No JVD present. No tracheal deviation present. 2 times thyromegaly present.    Pulmonary/Chest: Breath sounds normal.       Lab Results   Component Value Date/Time    TSH 0.208 (L) 08/31/2022 12:00 AM    T4, Free 1.51 02/21/2022 12:00 AM      Lab Results   Component Value Date/Time    Hemoglobin A1c 6.1 (H) 08/31/2022 12:00 AM    Hemoglobin A1c 5.9 (H) 02/21/2022 12:00 AM    Hemoglobin A1c 5.7 (H) 01/21/2021 09:05 AM    Hemoglobin A1c, External 6.1 12/23/2015 12:00 AM    Glucose 114 (H) 08/31/2022 12:00 AM    Microalbumin/Creat ratio (mg/g creat) 9 09/01/2021 11:45 AM    Microalb/Creat ratio (ug/mg creat.) 6 08/31/2022 12:00 AM    Microalbumin,urine random 0.67 09/01/2021 11:45 AM    LDL, calculated 109 (H) 08/31/2022 12:00 AM    LDL, calculated 99 09/01/2021 11:45 AM    Creatinine 0.90 08/31/2022 12:00 AM      Lab Results   Component Value Date/Time    Cholesterol, total 184 08/31/2022 12:00 AM    HDL Cholesterol 47 08/31/2022 12:00 AM    LDL, calculated 109 (H) 08/31/2022 12:00 AM    LDL, calculated 99 09/01/2021 11:45 AM    LDL-C, External 107 12/23/2015 12:00 AM    Triglyceride 157 (H) 08/31/2022 12:00 AM    CHOL/HDL Ratio 4.0 09/01/2021 11:45 AM             ASSESSMENT and PLAN     1. Hypothyroidism : perfectly euthyroid,    Sept 2022 :  TSH is 0.2, on current dose of 100 mcg UNITHROID BMN      2. Diabetes type 2 , uncontrolled : a1c is 6.1 %     from     aug 2022     compared to   5.9 %   From  July 2020   Compared to  A1C IS 6.5 %   FROM AUGUST 2019   COMPARED TO 6.8 %  FROM FEB- [facility] 2019     She is doing well on metformin er at 3 pills a day  ( one AM and 2  In PM )  Patient is advised to check blood sugars 1-2  times daily by rotation method. 3. HTN : continue LISINOPRIL 10 MG. 4.  Dyslipidemia : the LDL is going along with familial hypercholesteremia   continue LIPITOR 40 MG . Start on  zetia 10 mg   Patient is educated about risks associated with severe LDL including CAD and CVD . 5.  Body mass index is 33.71 kg/m². 6. . MNG :  From dec 2012- usg-- heterogenous gland and no nodules seen, but isthmus is thick - 5 mm   from jan 2011, with sub cm nodules bilaterally. Performed  usg jan 2014 :As no nodules were found to be significant, usg  Does not require tissue sampling and planning on observation only.            Labs in 6  months bnv      Reviewed results with patient and discussed the labs being ordered today/bnv  Patient voiced understanding of plan of care

## 2022-09-07 NOTE — PROGRESS NOTES
1. Have you been to the ER, urgent care clinic since your last visit? No  Hospitalized since your last visit? No    2. Have you seen or consulted any other health care providers outside of the [address] [place] [address] since your last visit? Include any pap smears or colon screening.  No      Wt Readings from Last 3 Encounters:   09/07/22 172 lb 9.6 oz (78.3 kg)   03/02/22 172 lb 12.8 oz (78.4 kg)   09/01/21 179 lb 9.6 oz (81.5 kg)     Temp Readings from Last 3 Encounters:   09/07/22 97.1 °F (36.2 °C) (Temporal)   03/02/22 98.6 °F (37 °C) (Temporal)   09/01/21 97.2 °F (36.2 °C) (Temporal)     BP Readings from Last 3 Encounters:   09/07/22 134/84   03/02/22 (!) 143/88   09/01/21 (!) 158/91     Pulse Readings from Last 3 Encounters:   09/07/22 87   03/02/22 85   09/01/21 97     Lab Results   Component Value Date/Time    Hemoglobin A1c 6.1 (H) 08/31/2022 12:00 AM    Hemoglobin A1c (POC) 5.7 09/01/2021 11:02 AM    Hemoglobin A1c, External 6.1 12/23/2015 12:00 AM

## 2022-09-07 NOTE — PATIENT INSTRUCTIONS
SPECIFIC INSTRUCTIONS BELOW     Start on zetia 10 mg at night      Unithroid  100   mcg  A  Day,   brand , on empty stomach with water only, no other meds or food or drinks   For next half hour       Take any kind of vitamins, calcium, iron   Pills  4 hours later    -------------PAY ATTENTION TO THESE GENERAL INSTRUCTIONS -----------------      - The medications prescribed at this visit will not be available at pharmacy until 6 pm       - YOUR MED LIST IS NOT UP TO DATE AS SOME CHANGES ARE BEING MADE AFTER THE VISIT - FOLLOW SPECIFIC INSTRUCTIONS  ABOVE     -ANY tests other than blood work, which you opt to do  outside the  [facility] imaging facilities, you are responsible for prior authorizations if  required    - [address] UP TO DATE ON YOUR AVS- PLEASE IGNORE     Results     *Normal results will not be notified by a phone call starting January 1 2021   *If you have an upcoming visit, the results will be discussed at the visit   *Please sign up for MY CHART if you want access to your lab and test results  *Abnormal results which require immediate attention will be notified by phone call   *Abnormal results which do not require immediate assistance will be notified in 1-2 weeks       Refills    -    have your pharmacy send us a refill request . Refills are done max for one year and a visit is a must before refills are extended    Follow up appointments -  highly encourage you to make it when you are checking out. We can accommodate you into the schedule based on your clinical situation, but not for extending refills beyond a year. Labs are important to give refills and is important to get labs before the visit     Phone calls  -  Allow  24 hrs.  for non-urgent calls to be returned  Prior authorization - It may take 2-4 weeks to process  Forms  -  FMLA, DMV etc., will take up to 2 weeks to process  Cancellations - please notify the office 2 days in advance   Samples  - will only be dispensed at visits       If not showing for the appointments and cancelling appointments within 24 hours are kept track of and three  of such situations in  two consecutive years will likely be considered for termination from the practice    -------------------------------------------------------------------------------------------------------------------

## 2023-03-03 RX ORDER — METFORMIN HYDROCHLORIDE 500 MG/1
TABLET, EXTENDED RELEASE ORAL
Qty: 270 TABLET | Refills: 3 | Status: SHIPPED | OUTPATIENT
Start: 2023-03-03

## 2023-03-08 ENCOUNTER — OFFICE VISIT (OUTPATIENT)
Dept: ENDOCRINOLOGY | Age: 68
End: 2023-03-08

## 2023-03-08 VITALS
HEART RATE: 96 BPM | DIASTOLIC BLOOD PRESSURE: 83 MMHG | HEIGHT: 60 IN | BODY MASS INDEX: 33.41 KG/M2 | WEIGHT: 170.2 LBS | OXYGEN SATURATION: 96 % | TEMPERATURE: 97.4 F | SYSTOLIC BLOOD PRESSURE: 145 MMHG

## 2023-03-08 DIAGNOSIS — E11.65 UNCONTROLLED TYPE 2 DIABETES MELLITUS WITH HYPERGLYCEMIA (HCC): Primary | ICD-10-CM

## 2023-03-08 DIAGNOSIS — I10 ESSENTIAL HYPERTENSION: ICD-10-CM

## 2023-03-08 DIAGNOSIS — E78.2 MIXED HYPERLIPIDEMIA: ICD-10-CM

## 2023-03-08 DIAGNOSIS — E03.4 HYPOTHYROIDISM DUE TO ACQUIRED ATROPHY OF THYROID: ICD-10-CM

## 2023-03-08 DIAGNOSIS — E04.2 NONTOXIC MULTINODULAR GOITER: ICD-10-CM

## 2023-03-08 NOTE — PROGRESS NOTES
Yann Rey is a 70 y.o. female here for   Chief Complaint   Patient presents with    Thyroid Problem    Vitamin D Deficiency       1. Have you been to the ER, urgent care clinic since your last visit? Hospitalized since your last visit? -No     2. Have you seen or consulted any other health care providers outside of the [address] [place] [address] since your last visit? Include any pap smears or colon screening.- Achilles foot and ankle for gout .

## 2023-03-08 NOTE — PATIENT INSTRUCTIONS
SPECIFIC INSTRUCTIONS BELOW     zetia 10 mg at night      Unithroid  100   mcg  A  Day,   brand , on empty stomach with water only, no other meds or food or drinks   For next half hour       Take any kind of vitamins, calcium, iron   Pills  4 hours later        -------------PAY ATTENTION TO THESE GENERAL INSTRUCTIONS -----------------      - The medications prescribed at this visit will not be available at pharmacy until 6 pm       - YOUR MED LIST IS NOT UP TO DATE AS SOME CHANGES ARE BEING MADE AFTER THE VISIT - FOLLOW SPECIFIC INSTRUCTIONS  ABOVE     -ANY tests other than blood work, which you opt to do  outside the  [facility] facilities, you are responsible for prior authorizations if  required    - [address] UP TO DATE ON YOUR AVS- PLEASE IGNORE     Results     *Normal results will not be notified by a phone call starting January 1 2021   *If you have an upcoming visit, the results will be discussed at the visit   *Please sign up for MY CHART if you want access to your lab and test results  *Abnormal results which require immediate attention will be notified by phone call   *Abnormal results which do not require immediate assistance will be notified in 1-2 weeks       Refills    -    have your pharmacy send us a refill request . Refills are done max for one year and a visit is a must before refills are extended    Follow up appointments -  highly encourage you to make it when you are checking out. We can accommodate you into the schedule based on your clinical situation, but not for extending refills beyond a year. Labs are important to give refills and is important to get labs before the visit     Phone calls  -  Allow  24 hrs.  for non-urgent calls to be returned  Prior authorization - It may take 2-4 weeks to process  Forms  -  FMLA, DMV etc., will take up to 2 weeks to process  Cancellations - please notify the office 2 days in advance   Samples  - will only be dispensed at visits       If not showing for the appointments and cancelling appointments within 24 hours are kept track of and three  of such situations in  two consecutive years will likely be considered for termination from the practice    -------------------------------------------------------------------------------------------------------------------

## 2023-03-08 NOTE — PROGRESS NOTES
Bar Grace MD [address]       HISTORY OF PRESENT ILLNESS       Eliseo Tan is a 70 y.o. female. HPI    F/u visit for hypothyroidism  AND  DM2   after last visit  Sept 2022       Diagnosed with GOUT in the interim- ( saw a podiatrist )  Lost 2 lbs       March 2022     Lost 7 lbs   She had to do  Few road  trips   She is now taking 100 mcg UNITHROID a day ,  Since our last conversation in sept 2021       Old history :    A long gap of 15 months   Reason unclear  Family issues   Lost 8 lbs   She got diagnosed with a1c of 6.8 %  2 weeks ago   She was suggested to take metformin but she refused it as she afraid of kidneys being hurt   She requested help for diabetes management too    Review of Systems   Constitutional: Negative. HENT: Negative. Psychiatric/Behavioral: Negative for depression and memory loss. The patient does not have insomnia. Physical Exam   Constitutional: She is oriented to person, place, and time. She appears well-developed and well-nourished. Neck: Normal range of motion. Neck supple. No JVD present. No tracheal deviation present. 2 times thyromegaly present.    Pulmonary/Chest: Breath sounds normal.       Lab Results   Component Value Date/Time    TSH 0.30 (L) 03/01/2023 10:37 AM    T4, Free 1.3 03/01/2023 10:37 AM      Lab Results   Component Value Date/Time    Hemoglobin A1c 6.4 (H) 03/01/2023 10:37 AM    Hemoglobin A1c 6.1 (H) 08/31/2022 12:00 AM    Hemoglobin A1c 5.9 (H) 02/21/2022 12:00 AM    Hemoglobin A1c, External 6.1 12/23/2015 12:00 AM    Glucose 119 (H) 03/01/2023 10:37 AM    Microalbumin/Creat ratio (mg/g creat) 6 03/01/2023 10:37 AM    Microalbumin,urine random 0.76 03/01/2023 10:37 AM    LDL, calculated 91.2 03/01/2023 10:37 AM    Creatinine 0.94 03/01/2023 10:37 AM      Lab Results   Component Value Date/Time    Cholesterol, total 171 03/01/2023 10:37 AM    HDL Cholesterol 60 03/01/2023 10:37 AM    LDL, calculated 91.2 03/01/2023 10:37 AM    LDL-C, External 107 12/23/2015 12:00 AM    Triglyceride 99 03/01/2023 10:37 AM    CHOL/HDL Ratio 2.9 03/01/2023 10:37 AM             ASSESSMENT and PLAN     1. Hypothyroidism : perfectly euthyroid,    March 2023 : TSH is 0.3  - stay on unithroid 100 mcg dose a day   Sept 2022 :  TSH is 0.2, on current dose of 100 mcg UNITHROID BMN      2. Diabetes type 2 , uncontrolled : a1c is    6.4 %   from     march 2023  compared to  6.1 %     from     aug 2022     compared to   5.9 %   From  July 2020   Compared to  A1C IS 6.5 %   FROM AUGUST 2019   COMPARED TO 6.8 %  FROM FEB- [facility] 2019     She is doing well on metformin er at 3 pills a day  ( one AM and 2  In PM )  Discussed inj incretin  -       Her mother , 2 sisters needed ESRD   Patient is advised to check blood sugars 1-2  times daily by rotation method. 3. HTN : continue LISINOPRIL 10 MG. 4.  Dyslipidemia : the LDL is going along with familial hypercholesteremia   continue LIPITOR 40 MG . Stay  on  zetia 10 mg   Patient is educated about risks associated with severe LDL including CAD and CVD . 5.  Body mass index is 33.24 kg/m². 6. . MNG :  From dec 2012- [facility]-- heterogenous gland and no nodules seen, but isthmus is thick - 5 mm   from jan 2011, with sub cm nodules bilaterally. Performed  [facility] jan 2014 :As no nodules were found to be significant, [facility]  Does not require tissue sampling and planning on observation only.          7. Hypercalcemia  : high normal  -         8. 2 episodes of GOUT       Labs in 12   months bnv      Reviewed results with patient and discussed the labs being ordered today/bnv  Patient voiced understanding of plan of care

## 2023-05-25 RX ORDER — ATORVASTATIN CALCIUM 40 MG/1
1 TABLET, FILM COATED ORAL DAILY
COMMUNITY
Start: 2022-03-02 | End: 2023-05-26

## 2023-05-25 RX ORDER — LEVOTHYROXINE SODIUM 0.1 MG/1
1 TABLET ORAL DAILY
COMMUNITY
Start: 2022-09-07

## 2023-05-25 RX ORDER — METFORMIN HYDROCHLORIDE 500 MG/1
TABLET, EXTENDED RELEASE ORAL
COMMUNITY
Start: 2023-03-03

## 2023-05-25 RX ORDER — EZETIMIBE 10 MG/1
10 TABLET ORAL DAILY
COMMUNITY
Start: 2022-09-07

## 2023-05-25 RX ORDER — LISINOPRIL 10 MG/1
1 TABLET ORAL NIGHTLY
COMMUNITY
Start: 2022-03-02 | End: 2023-05-26

## 2023-05-26 DIAGNOSIS — E78.2 MIXED HYPERLIPIDEMIA: ICD-10-CM

## 2023-05-26 DIAGNOSIS — I10 ESSENTIAL (PRIMARY) HYPERTENSION: Primary | ICD-10-CM

## 2023-05-26 RX ORDER — LISINOPRIL 10 MG/1
TABLET ORAL
Qty: 90 TABLET | Refills: 3 | Status: SHIPPED | OUTPATIENT
Start: 2023-05-26

## 2023-05-26 RX ORDER — ATORVASTATIN CALCIUM 40 MG/1
TABLET, FILM COATED ORAL DAILY
Qty: 90 TABLET | Refills: 3 | Status: SHIPPED | OUTPATIENT
Start: 2023-05-26

## 2023-08-24 RX ORDER — EZETIMIBE 10 MG/1
10 TABLET ORAL DAILY
Qty: 90 TABLET | Refills: 3 | Status: SHIPPED | OUTPATIENT
Start: 2023-08-24

## 2023-11-20 DIAGNOSIS — E03.4 ATROPHY OF THYROID (ACQUIRED): ICD-10-CM

## 2023-11-20 DIAGNOSIS — E11.65 TYPE 2 DIABETES MELLITUS WITH HYPERGLYCEMIA, UNSPECIFIED WHETHER LONG TERM INSULIN USE (HCC): Primary | ICD-10-CM

## 2023-11-20 RX ORDER — LEVOTHYROXINE SODIUM 100 UG/1
100 TABLET ORAL DAILY
Qty: 90 TABLET | Refills: 3 | Status: SHIPPED | OUTPATIENT
Start: 2023-11-20

## 2024-02-21 RX ORDER — METFORMIN HYDROCHLORIDE 500 MG/1
TABLET, EXTENDED RELEASE ORAL
Qty: 270 TABLET | Refills: 3 | Status: SHIPPED | OUTPATIENT
Start: 2024-02-21

## 2024-03-01 ENCOUNTER — NURSE ONLY (OUTPATIENT)
Age: 69
End: 2024-03-01

## 2024-03-01 DIAGNOSIS — E03.4 ATROPHY OF THYROID (ACQUIRED): ICD-10-CM

## 2024-03-01 DIAGNOSIS — E11.65 TYPE 2 DIABETES MELLITUS WITH HYPERGLYCEMIA, UNSPECIFIED WHETHER LONG TERM INSULIN USE (HCC): ICD-10-CM

## 2024-03-01 DIAGNOSIS — E11.65 TYPE 2 DIABETES MELLITUS WITH HYPERGLYCEMIA, UNSPECIFIED WHETHER LONG TERM INSULIN USE (HCC): Primary | ICD-10-CM

## 2024-03-01 LAB
ALBUMIN SERPL-MCNC: 3.8 G/DL (ref 3.5–5)
ALBUMIN/GLOB SERPL: 1.2 (ref 1.1–2.2)
ALP SERPL-CCNC: 92 U/L (ref 45–117)
ALT SERPL-CCNC: 26 U/L (ref 12–78)
ANION GAP SERPL CALC-SCNC: 3 MMOL/L (ref 5–15)
AST SERPL-CCNC: 17 U/L (ref 15–37)
BILIRUB SERPL-MCNC: 0.4 MG/DL (ref 0.2–1)
BUN SERPL-MCNC: 12 MG/DL (ref 6–20)
BUN/CREAT SERPL: 13 (ref 12–20)
CALCIUM SERPL-MCNC: 9.9 MG/DL (ref 8.5–10.1)
CHLORIDE SERPL-SCNC: 107 MMOL/L (ref 97–108)
CHOLEST SERPL-MCNC: 160 MG/DL
CO2 SERPL-SCNC: 30 MMOL/L (ref 21–32)
CREAT SERPL-MCNC: 0.94 MG/DL (ref 0.55–1.02)
CREAT UR-MCNC: 146 MG/DL
EST. AVERAGE GLUCOSE BLD GHB EST-MCNC: 126 MG/DL
GLOBULIN SER CALC-MCNC: 3.3 G/DL (ref 2–4)
GLUCOSE SERPL-MCNC: 130 MG/DL (ref 65–100)
HBA1C MFR BLD: 6 % (ref 4–5.6)
HDLC SERPL-MCNC: 55 MG/DL
HDLC SERPL: 2.9 (ref 0–5)
LDLC SERPL CALC-MCNC: 79.2 MG/DL (ref 0–100)
MICROALBUMIN UR-MCNC: 1 MG/DL
MICROALBUMIN/CREAT UR-RTO: 7 MG/G (ref 0–30)
POTASSIUM SERPL-SCNC: 4.2 MMOL/L (ref 3.5–5.1)
PROT SERPL-MCNC: 7.1 G/DL (ref 6.4–8.2)
SODIUM SERPL-SCNC: 140 MMOL/L (ref 136–145)
T4 FREE SERPL-MCNC: 1.4 NG/DL (ref 0.8–1.5)
TRIGL SERPL-MCNC: 129 MG/DL
TSH SERPL DL<=0.05 MIU/L-ACNC: 0.24 UIU/ML (ref 0.36–3.74)
VLDLC SERPL CALC-MCNC: 25.8 MG/DL

## 2024-03-27 ENCOUNTER — OFFICE VISIT (OUTPATIENT)
Age: 69
End: 2024-03-27
Payer: MEDICARE

## 2024-03-27 VITALS
HEIGHT: 60 IN | SYSTOLIC BLOOD PRESSURE: 157 MMHG | TEMPERATURE: 98.9 F | DIASTOLIC BLOOD PRESSURE: 86 MMHG | BODY MASS INDEX: 32.59 KG/M2 | HEART RATE: 92 BPM | RESPIRATION RATE: 15 BRPM | OXYGEN SATURATION: 96 % | WEIGHT: 166 LBS

## 2024-03-27 DIAGNOSIS — E11.65 TYPE 2 DIABETES MELLITUS WITH HYPERGLYCEMIA, UNSPECIFIED WHETHER LONG TERM INSULIN USE (HCC): ICD-10-CM

## 2024-03-27 DIAGNOSIS — E04.2 NONTOXIC MULTINODULAR GOITER: ICD-10-CM

## 2024-03-27 DIAGNOSIS — I10 ESSENTIAL (PRIMARY) HYPERTENSION: ICD-10-CM

## 2024-03-27 DIAGNOSIS — E78.2 MIXED HYPERLIPIDEMIA: ICD-10-CM

## 2024-03-27 DIAGNOSIS — E03.4 ATROPHY OF THYROID (ACQUIRED): ICD-10-CM

## 2024-03-27 DIAGNOSIS — E11.65 TYPE 2 DIABETES MELLITUS WITH HYPERGLYCEMIA, UNSPECIFIED WHETHER LONG TERM INSULIN USE (HCC): Primary | ICD-10-CM

## 2024-03-27 PROCEDURE — G8400 PT W/DXA NO RESULTS DOC: HCPCS | Performed by: INTERNAL MEDICINE

## 2024-03-27 PROCEDURE — 99214 OFFICE O/P EST MOD 30 MIN: CPT | Performed by: INTERNAL MEDICINE

## 2024-03-27 PROCEDURE — 3079F DIAST BP 80-89 MM HG: CPT | Performed by: INTERNAL MEDICINE

## 2024-03-27 PROCEDURE — G8484 FLU IMMUNIZE NO ADMIN: HCPCS | Performed by: INTERNAL MEDICINE

## 2024-03-27 PROCEDURE — 1036F TOBACCO NON-USER: CPT | Performed by: INTERNAL MEDICINE

## 2024-03-27 PROCEDURE — G8417 CALC BMI ABV UP PARAM F/U: HCPCS | Performed by: INTERNAL MEDICINE

## 2024-03-27 PROCEDURE — 3077F SYST BP >= 140 MM HG: CPT | Performed by: INTERNAL MEDICINE

## 2024-03-27 PROCEDURE — G8427 DOCREV CUR MEDS BY ELIG CLIN: HCPCS | Performed by: INTERNAL MEDICINE

## 2024-03-27 PROCEDURE — 1123F ACP DISCUSS/DSCN MKR DOCD: CPT | Performed by: INTERNAL MEDICINE

## 2024-03-27 PROCEDURE — 2022F DILAT RTA XM EVC RTNOPTHY: CPT | Performed by: INTERNAL MEDICINE

## 2024-03-27 PROCEDURE — 1090F PRES/ABSN URINE INCON ASSESS: CPT | Performed by: INTERNAL MEDICINE

## 2024-03-27 PROCEDURE — 3044F HG A1C LEVEL LT 7.0%: CPT | Performed by: INTERNAL MEDICINE

## 2024-03-27 PROCEDURE — 3017F COLORECTAL CA SCREEN DOC REV: CPT | Performed by: INTERNAL MEDICINE

## 2024-03-27 RX ORDER — ATORVASTATIN CALCIUM 40 MG/1
TABLET, FILM COATED ORAL
Qty: 90 TABLET | Refills: 3 | Status: SHIPPED | OUTPATIENT
Start: 2024-03-27

## 2024-03-27 RX ORDER — LISINOPRIL 10 MG/1
10 TABLET ORAL NIGHTLY
Qty: 90 TABLET | Refills: 3 | Status: SHIPPED | OUTPATIENT
Start: 2024-03-27 | End: 2025-03-27

## 2024-03-27 RX ORDER — LEVOTHYROXINE SODIUM 100 UG/1
100 TABLET ORAL DAILY
Qty: 90 TABLET | Refills: 3 | Status: SHIPPED | OUTPATIENT
Start: 2024-03-27

## 2024-03-27 RX ORDER — EZETIMIBE 10 MG/1
10 TABLET ORAL DAILY
Qty: 90 TABLET | Refills: 3 | Status: SHIPPED | OUTPATIENT
Start: 2024-03-27

## 2024-03-27 NOTE — PROGRESS NOTES
Lenore Brooks is a 69 y.o. female here for   Chief Complaint   Patient presents with    Diabetes    Thyroid Problem       All medication reviewed.     Vitals:    03/27/24 0856   BP: (!) 157/86   Site: Right Upper Arm   Position: Sitting   Cuff Size: Large Adult   Pulse: 92   Resp: 15   Temp: 98.9 °F (37.2 °C)   TempSrc: Temporal   SpO2: 96%   Weight: 75.3 kg (166 lb)   Height: 1.524 m (5')        1. Have you been to the ER, urgent care clinic since your last visit?  Hospitalized since your last visit? -[facility] for frozen left shoulder back in January 2024    2. Have you seen or consulted any other health care providers outside of the [facility] since your last visit?  Include any pap smears or colon screening.-No      
AGRATIO 1.2 03/01/2024    GLOB 3.3 03/01/2024    CHOL 160 03/01/2024    TRIG 129 03/01/2024    LDLCALC 79.2 03/01/2024    HDL 55 03/01/2024        Lab Results   Component Value Date    MALBCR 7 03/01/2024        Lab Results   Component Value Date    TSH 0.30 (L) 03/01/2023    HTO7BPQ 0.24 (L) 03/01/2024    T4FREE 1.4 03/01/2024                    ASSESSMENT and PLAN       1. Hypothyroidism : perfectly euthyroid,     March 2024  : TSH is 0.2  - stay on unithroid 100 mcg dose a day . I reassured her that it is ok to have 0.2           2. Diabetes type 2 , uncontrolled : a1c is  6%  from  march 2024    compared  to     6.4 %   from     march 2023  compared to  6.1 %     from     aug 2022      compared to   5.9 %   From  July 2020   Compared to  A1C IS 6.5 %   FROM AUGUST 2019   COMPARED TO 6.8 %  FROM FEB- MARCH 2019       She is doing well on metformin er at 3 pills a day  ( one AM and 2  In PM )   Discussed inj incretin  -        Her mother , 2 sisters needed ESRD    Patient is advised to check blood sugars 1-2  times daily by rotation method.            3.  HTN : continue LISINOPRIL 10 MG.          4.  Dyslipidemia : the LDL is going along with familial hypercholesteremia , but stays in good control    continue LIPITOR 40 MG . Stay  on  zetia 10 mg    Patient is educated about risks associated with severe LDL including CAD and CVD .      5.  March 2024  : Body mass index is 32.42 kg/m².  Body mass index is 33.24 kg/m².            6. .MNG :  From dec 2012- [facility]-- heterogenous gland and no nodules seen, but isthmus is thick - 5 mm    from jan 2011, with sub cm nodules bilaterally.    Performed  [facility] jan 2014 :As no nodules were found to be significant, usg   Does not require tissue sampling and planning on observation only.             7. Hypercalcemia  : high normal  - 2 occasions,  back down to normal             8. 2 episodes of GOUT            Labs in 12   months bnv         Reviewed results with patient and

## 2024-03-27 NOTE — PATIENT INSTRUCTIONS
zetia 10 mg at night   along with lipitor           Unithroid  100   mcg  A  Day,   brand , on empty stomach with water only, no other meds or food or drinks   For next half hour        Take any kind of vitamins, calcium, iron   Pills  4 hours later

## 2024-11-16 DIAGNOSIS — E11.65 TYPE 2 DIABETES MELLITUS WITH HYPERGLYCEMIA, UNSPECIFIED WHETHER LONG TERM INSULIN USE (HCC): ICD-10-CM

## 2024-11-16 DIAGNOSIS — I10 ESSENTIAL (PRIMARY) HYPERTENSION: ICD-10-CM

## 2024-11-16 DIAGNOSIS — E78.2 MIXED HYPERLIPIDEMIA: ICD-10-CM

## 2024-11-16 DIAGNOSIS — E03.4 ATROPHY OF THYROID (ACQUIRED): ICD-10-CM

## 2024-11-18 RX ORDER — LEVOTHYROXINE SODIUM 100 UG/1
100 TABLET ORAL DAILY
Qty: 90 TABLET | Refills: 3 | Status: SHIPPED | OUTPATIENT
Start: 2024-11-18

## 2025-02-11 DIAGNOSIS — E11.65 TYPE 2 DIABETES MELLITUS WITH HYPERGLYCEMIA, UNSPECIFIED WHETHER LONG TERM INSULIN USE (HCC): Primary | ICD-10-CM

## 2025-02-11 RX ORDER — METFORMIN HYDROCHLORIDE 500 MG/1
TABLET, EXTENDED RELEASE ORAL
Qty: 270 TABLET | Refills: 3 | Status: SHIPPED | OUTPATIENT
Start: 2025-02-11

## 2025-03-21 ENCOUNTER — LAB (OUTPATIENT)
Age: 70
End: 2025-03-21

## 2025-03-21 DIAGNOSIS — E11.65 TYPE 2 DIABETES MELLITUS WITH HYPERGLYCEMIA, UNSPECIFIED WHETHER LONG TERM INSULIN USE (HCC): ICD-10-CM

## 2025-03-21 DIAGNOSIS — E04.2 NONTOXIC MULTINODULAR GOITER: ICD-10-CM

## 2025-03-21 DIAGNOSIS — I10 ESSENTIAL (PRIMARY) HYPERTENSION: ICD-10-CM

## 2025-03-21 DIAGNOSIS — E78.2 MIXED HYPERLIPIDEMIA: ICD-10-CM

## 2025-03-21 DIAGNOSIS — E03.4 ATROPHY OF THYROID (ACQUIRED): ICD-10-CM

## 2025-03-21 LAB
ALBUMIN SERPL-MCNC: 3.9 G/DL (ref 3.5–5)
ALBUMIN/GLOB SERPL: 1.3 (ref 1.1–2.2)
ALP SERPL-CCNC: 84 U/L (ref 45–117)
ALT SERPL-CCNC: 23 U/L (ref 12–78)
ANION GAP SERPL CALC-SCNC: 4 MMOL/L (ref 2–12)
AST SERPL-CCNC: 18 U/L (ref 15–37)
BILIRUB SERPL-MCNC: 0.6 MG/DL (ref 0.2–1)
BUN SERPL-MCNC: 13 MG/DL (ref 6–20)
BUN/CREAT SERPL: 18 (ref 12–20)
CALCIUM SERPL-MCNC: 9.6 MG/DL (ref 8.5–10.1)
CHLORIDE SERPL-SCNC: 110 MMOL/L (ref 97–108)
CHOLEST SERPL-MCNC: 125 MG/DL
CO2 SERPL-SCNC: 27 MMOL/L (ref 21–32)
CREAT SERPL-MCNC: 0.74 MG/DL (ref 0.55–1.02)
CREAT UR-MCNC: 106 MG/DL
EST. AVERAGE GLUCOSE BLD GHB EST-MCNC: 134 MG/DL
GLOBULIN SER CALC-MCNC: 3 G/DL (ref 2–4)
GLUCOSE SERPL-MCNC: 126 MG/DL (ref 65–100)
HBA1C MFR BLD: 6.3 % (ref 4–5.6)
HDLC SERPL-MCNC: 52 MG/DL
HDLC SERPL: 2.4 (ref 0–5)
LDLC SERPL CALC-MCNC: 52 MG/DL (ref 0–100)
MICROALBUMIN UR-MCNC: 1.37 MG/DL
MICROALBUMIN/CREAT UR-RTO: 13 MG/G (ref 0–30)
POTASSIUM SERPL-SCNC: 4.4 MMOL/L (ref 3.5–5.1)
PROT SERPL-MCNC: 6.9 G/DL (ref 6.4–8.2)
SODIUM SERPL-SCNC: 141 MMOL/L (ref 136–145)
T4 FREE SERPL-MCNC: 1.5 NG/DL (ref 0.8–1.5)
TRIGL SERPL-MCNC: 105 MG/DL
TSH SERPL DL<=0.05 MIU/L-ACNC: 0.11 UIU/ML (ref 0.36–3.74)
VLDLC SERPL CALC-MCNC: 21 MG/DL

## 2025-03-28 ENCOUNTER — OFFICE VISIT (OUTPATIENT)
Age: 70
End: 2025-03-28
Payer: MEDICARE

## 2025-03-28 VITALS
SYSTOLIC BLOOD PRESSURE: 150 MMHG | HEIGHT: 60 IN | BODY MASS INDEX: 31.88 KG/M2 | TEMPERATURE: 97.2 F | HEART RATE: 76 BPM | DIASTOLIC BLOOD PRESSURE: 77 MMHG | WEIGHT: 162.4 LBS | OXYGEN SATURATION: 96 %

## 2025-03-28 DIAGNOSIS — E78.2 MIXED HYPERLIPIDEMIA: ICD-10-CM

## 2025-03-28 DIAGNOSIS — E03.4 ATROPHY OF THYROID (ACQUIRED): ICD-10-CM

## 2025-03-28 DIAGNOSIS — E11.65 TYPE 2 DIABETES MELLITUS WITH HYPERGLYCEMIA, UNSPECIFIED WHETHER LONG TERM INSULIN USE (HCC): Primary | ICD-10-CM

## 2025-03-28 DIAGNOSIS — I10 ESSENTIAL (PRIMARY) HYPERTENSION: ICD-10-CM

## 2025-03-28 PROCEDURE — 1126F AMNT PAIN NOTED NONE PRSNT: CPT | Performed by: INTERNAL MEDICINE

## 2025-03-28 PROCEDURE — 3044F HG A1C LEVEL LT 7.0%: CPT | Performed by: INTERNAL MEDICINE

## 2025-03-28 PROCEDURE — 3077F SYST BP >= 140 MM HG: CPT | Performed by: INTERNAL MEDICINE

## 2025-03-28 PROCEDURE — 1090F PRES/ABSN URINE INCON ASSESS: CPT | Performed by: INTERNAL MEDICINE

## 2025-03-28 PROCEDURE — 1123F ACP DISCUSS/DSCN MKR DOCD: CPT | Performed by: INTERNAL MEDICINE

## 2025-03-28 PROCEDURE — G8400 PT W/DXA NO RESULTS DOC: HCPCS | Performed by: INTERNAL MEDICINE

## 2025-03-28 PROCEDURE — 99214 OFFICE O/P EST MOD 30 MIN: CPT | Performed by: INTERNAL MEDICINE

## 2025-03-28 PROCEDURE — 3017F COLORECTAL CA SCREEN DOC REV: CPT | Performed by: INTERNAL MEDICINE

## 2025-03-28 PROCEDURE — G8417 CALC BMI ABV UP PARAM F/U: HCPCS | Performed by: INTERNAL MEDICINE

## 2025-03-28 PROCEDURE — 2022F DILAT RTA XM EVC RTNOPTHY: CPT | Performed by: INTERNAL MEDICINE

## 2025-03-28 PROCEDURE — G8427 DOCREV CUR MEDS BY ELIG CLIN: HCPCS | Performed by: INTERNAL MEDICINE

## 2025-03-28 PROCEDURE — 3078F DIAST BP <80 MM HG: CPT | Performed by: INTERNAL MEDICINE

## 2025-03-28 PROCEDURE — 1036F TOBACCO NON-USER: CPT | Performed by: INTERNAL MEDICINE

## 2025-03-28 RX ORDER — ATORVASTATIN CALCIUM 40 MG/1
TABLET, FILM COATED ORAL
Qty: 90 TABLET | Refills: 3 | Status: SHIPPED | OUTPATIENT
Start: 2025-03-28

## 2025-03-28 RX ORDER — LISINOPRIL 10 MG/1
10 TABLET ORAL NIGHTLY
Qty: 90 TABLET | Refills: 3 | Status: SHIPPED | OUTPATIENT
Start: 2025-03-28 | End: 2026-03-28

## 2025-03-28 RX ORDER — EZETIMIBE 10 MG/1
10 TABLET ORAL DAILY
Qty: 90 TABLET | Refills: 3 | Status: SHIPPED | OUTPATIENT
Start: 2025-03-28

## 2025-03-28 NOTE — PROGRESS NOTES
Lenore Brooks is a 70 y.o. female here for No chief complaint on file.      1. Have you been to the ER, urgent care clinic since your last visit?  Hospitalized since your last visit? - no    2. Have you seen or consulted any other health care providers outside of the [facility] since your last visit?  Include any pap smears or colon screening.- no      
03/21/2025    GFRAA 69 02/21/2022    AGRATIO 1.3 03/01/2023    GLOB 3.0 03/21/2025    CHOL 125 03/21/2025    TRIG 105 03/21/2025    LDL 52 03/21/2025    HDL 52 03/21/2025        Lab Results   Component Value Date    TSH 0.11 (L) 03/21/2025    T4FREE 1.5 03/21/2025                    ASSESSMENT and PLAN       1. Hypothyroidism : perfectly euthyroid,      March 2025  : TSH is 0.1,  will change dose today to  half  pill   on Sundays    , brand UNITHROID  100 mcg dose , check labs in 6 months     March 2024  : TSH is 0.2  - stay on unithroid 100 mcg dose a day . I reassured her that it is ok to have 0.2           2. Diabetes type 2 , uncontrolled : a1c is 6.3%  from March 2025  compared to   6%  from  march 2024    compared  to     6.4 %   from     march 2023  compared to  6.1 %     from     aug 2022      compared to   5.9 %   From  July 2020   Compared to  A1C IS 6.5 %   FROM AUGUST 2019   COMPARED TO 6.8 %  FROM FEB- MARCH 2019       She is doing well on metformin er at 3 pills a day  ( one AM and 2  In PM )   Discussed inj incretin  - ( side effects )  Discussed SGL 2   ( side effects )       Her mother , 2 sisters needed ESRD    Patient is advised to check blood sugars 1-2  times daily by rotation method.            3.  HTN : continue LISINOPRIL 10 MG.          4.  Dyslipidemia : the LDL is going along with familial hypercholesteremia , but stays in good control    continue LIPITOR 40 MG . Stay  on  zetia 10 mg    Patient is educated about risks associated with severe LDL including CAD and CVD .      5.  MNG :  From dec 2012- [facility]-- heterogenous gland and no nodules seen, but isthmus is thick - 5 mm   from jan 2011, with sub cm nodules bilaterally.    Performed  [facility] jan 2014 :As no nodules were found to be significant, [facility]   Does not require tissue sampling and planning on observation only.           7. Hypercalcemia  : high normal  - 2 occasions,  back down to normal           8. 2 episodes of GOUT          Labs in

## 2025-03-28 NOTE — PATIENT INSTRUCTIONS
zetia 10 mg at night   along with lipitor '      Metformin er 3 pills a day           Unithroid  100   mcg  A  Day,   brand , on empty stomach with water only, no other meds or food or drinks   For next half hour     ( DNCP )     Take any kind of vitamins, calcium, iron   Pills  4 hours later

## 2025-05-09 DIAGNOSIS — E03.4 ATROPHY OF THYROID (ACQUIRED): ICD-10-CM

## 2025-05-09 DIAGNOSIS — E78.2 MIXED HYPERLIPIDEMIA: ICD-10-CM

## 2025-05-09 DIAGNOSIS — I10 ESSENTIAL (PRIMARY) HYPERTENSION: ICD-10-CM

## 2025-05-09 DIAGNOSIS — E11.65 TYPE 2 DIABETES MELLITUS WITH HYPERGLYCEMIA, UNSPECIFIED WHETHER LONG TERM INSULIN USE (HCC): ICD-10-CM

## 2025-05-09 RX ORDER — EZETIMIBE 10 MG/1
10 TABLET ORAL DAILY
Qty: 90 TABLET | Refills: 3 | Status: SHIPPED | OUTPATIENT
Start: 2025-05-09